# Patient Record
Sex: MALE | Race: BLACK OR AFRICAN AMERICAN | NOT HISPANIC OR LATINO | URBAN - METROPOLITAN AREA
[De-identification: names, ages, dates, MRNs, and addresses within clinical notes are randomized per-mention and may not be internally consistent; named-entity substitution may affect disease eponyms.]

---

## 2021-11-02 ENCOUNTER — INPATIENT (INPATIENT)
Facility: HOSPITAL | Age: 26
LOS: 14 days | Discharge: HOME | End: 2021-11-17
Attending: PSYCHIATRY & NEUROLOGY | Admitting: PSYCHIATRY & NEUROLOGY
Payer: COMMERCIAL

## 2021-11-02 VITALS — HEIGHT: 73 IN

## 2021-11-02 LAB
ALBUMIN SERPL ELPH-MCNC: 4.7 G/DL — SIGNIFICANT CHANGE UP (ref 3.3–5)
ALP SERPL-CCNC: 52 U/L — SIGNIFICANT CHANGE UP (ref 40–120)
ALT FLD-CCNC: 20 U/L — SIGNIFICANT CHANGE UP (ref 4–41)
ANION GAP SERPL CALC-SCNC: 14 MMOL/L — SIGNIFICANT CHANGE UP (ref 7–14)
APAP SERPL-MCNC: <5 UG/ML — LOW (ref 15–25)
AST SERPL-CCNC: 25 U/L — SIGNIFICANT CHANGE UP (ref 4–40)
BASOPHILS # BLD AUTO: 0.06 K/UL — SIGNIFICANT CHANGE UP (ref 0–0.2)
BASOPHILS NFR BLD AUTO: 0.8 % — SIGNIFICANT CHANGE UP (ref 0–2)
BILIRUB SERPL-MCNC: 0.2 MG/DL — SIGNIFICANT CHANGE UP (ref 0.2–1.2)
BUN SERPL-MCNC: 7 MG/DL — SIGNIFICANT CHANGE UP (ref 7–23)
CALCIUM SERPL-MCNC: 9.8 MG/DL — SIGNIFICANT CHANGE UP (ref 8.4–10.5)
CHLORIDE SERPL-SCNC: 103 MMOL/L — SIGNIFICANT CHANGE UP (ref 98–107)
CO2 SERPL-SCNC: 24 MMOL/L — SIGNIFICANT CHANGE UP (ref 22–31)
CREAT SERPL-MCNC: 0.83 MG/DL — SIGNIFICANT CHANGE UP (ref 0.5–1.3)
EOSINOPHIL # BLD AUTO: 0.12 K/UL — SIGNIFICANT CHANGE UP (ref 0–0.5)
EOSINOPHIL NFR BLD AUTO: 1.6 % — SIGNIFICANT CHANGE UP (ref 0–6)
ETHANOL SERPL-MCNC: <10 MG/DL — SIGNIFICANT CHANGE UP
GLUCOSE SERPL-MCNC: 100 MG/DL — HIGH (ref 70–99)
HCT VFR BLD CALC: 41.2 % — SIGNIFICANT CHANGE UP (ref 39–50)
HGB BLD-MCNC: 14.3 G/DL — SIGNIFICANT CHANGE UP (ref 13–17)
IANC: 5.07 K/UL — SIGNIFICANT CHANGE UP (ref 1.5–8.5)
IMM GRANULOCYTES NFR BLD AUTO: 0.1 % — SIGNIFICANT CHANGE UP (ref 0–1.5)
LYMPHOCYTES # BLD AUTO: 1.57 K/UL — SIGNIFICANT CHANGE UP (ref 1–3.3)
LYMPHOCYTES # BLD AUTO: 21.2 % — SIGNIFICANT CHANGE UP (ref 13–44)
MCHC RBC-ENTMCNC: 28.9 PG — SIGNIFICANT CHANGE UP (ref 27–34)
MCHC RBC-ENTMCNC: 34.7 GM/DL — SIGNIFICANT CHANGE UP (ref 32–36)
MCV RBC AUTO: 83.2 FL — SIGNIFICANT CHANGE UP (ref 80–100)
MONOCYTES # BLD AUTO: 0.57 K/UL — SIGNIFICANT CHANGE UP (ref 0–0.9)
MONOCYTES NFR BLD AUTO: 7.7 % — SIGNIFICANT CHANGE UP (ref 2–14)
NEUTROPHILS # BLD AUTO: 5.07 K/UL — SIGNIFICANT CHANGE UP (ref 1.8–7.4)
NEUTROPHILS NFR BLD AUTO: 68.6 % — SIGNIFICANT CHANGE UP (ref 43–77)
NRBC # BLD: 0 /100 WBCS — SIGNIFICANT CHANGE UP
NRBC # FLD: 0 K/UL — SIGNIFICANT CHANGE UP
PLATELET # BLD AUTO: 368 K/UL — SIGNIFICANT CHANGE UP (ref 150–400)
POTASSIUM SERPL-MCNC: 4 MMOL/L — SIGNIFICANT CHANGE UP (ref 3.5–5.3)
POTASSIUM SERPL-SCNC: 4 MMOL/L — SIGNIFICANT CHANGE UP (ref 3.5–5.3)
PROT SERPL-MCNC: 7.4 G/DL — SIGNIFICANT CHANGE UP (ref 6–8.3)
RBC # BLD: 4.95 M/UL — SIGNIFICANT CHANGE UP (ref 4.2–5.8)
RBC # FLD: 13.9 % — SIGNIFICANT CHANGE UP (ref 10.3–14.5)
SALICYLATES SERPL-MCNC: <0.3 MG/DL — LOW (ref 15–30)
SARS-COV-2 RNA SPEC QL NAA+PROBE: SIGNIFICANT CHANGE UP
SODIUM SERPL-SCNC: 141 MMOL/L — SIGNIFICANT CHANGE UP (ref 135–145)
TOXICOLOGY SCREEN, DRUGS OF ABUSE, SERUM RESULT: SIGNIFICANT CHANGE UP
TSH SERPL-MCNC: 1.66 UIU/ML — SIGNIFICANT CHANGE UP (ref 0.27–4.2)
WBC # BLD: 7.4 K/UL — SIGNIFICANT CHANGE UP (ref 3.8–10.5)
WBC # FLD AUTO: 7.4 K/UL — SIGNIFICANT CHANGE UP (ref 3.8–10.5)

## 2021-11-02 PROCEDURE — 99285 EMERGENCY DEPT VISIT HI MDM: CPT

## 2021-11-02 RX ORDER — RISPERIDONE 4 MG/1
2 TABLET ORAL ONCE
Refills: 0 | Status: COMPLETED | OUTPATIENT
Start: 2021-11-02 | End: 2021-11-02
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
## 2021-11-02 NOTE — ED BEHAVIORAL HEALTH ASSESSMENT NOTE - OTHER PAST PSYCHIATRIC HISTORY (INCLUDE DETAILS REGARDING ONSET, COURSE OF ILLNESS, INPATIENT/OUTPATIENT TREATMENT)
Schizo Schizoaffective d/o; several psych admissions, Pt is poor historian, states that the last hospitalization was several years ago, he report that he was at [facility] in  2015. Last treatment was in [facility]

## 2021-11-02 NOTE — ED BEHAVIORAL HEALTH ASSESSMENT NOTE - RISK ASSESSMENT
High risk:  Risk factors: Recent auditory hallucinations of voices telling him to burn his sister's dolls.  Protective factors: Some insight related to his illness, aware these voices are not real. Low Acute Suicide Risk High risk:  Risk factors: psychosis  Protective factors: denies any S/H I/I/P No HX of violence, no CAH
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
## 2021-11-02 NOTE — ED ADULT NURSE NOTE - NSIMPLEMENTINTERV_GEN_ALL_ED
Implemented All Universal Safety Interventions:  [place] to call system. Call bell, personal items and telephone within reach. Instruct patient to call for assistance. Room bathroom lighting operational. Non-slip footwear when patient is off stretcher. Physically safe environment: no spills, clutter or unnecessary equipment. Stretcher in lowest position, wheels locked, appropriate side rails in place.
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
## 2021-11-02 NOTE — ED BEHAVIORAL HEALTH ASSESSMENT NOTE - PATIENT'S CHIEF COMPLAINT
"I called 911 because my father did not give me mponey" "I called 911 because my father did not give me money"

## 2021-11-02 NOTE — ED BEHAVIORAL HEALTH ASSESSMENT NOTE - DESCRIPTION
None Single, unemployed, resides with mother and siblings. Vital Signs Last 24 Hrs  T(C): 37.2 (02 Nov 2021 23:02), Max: 37.2 (02 Nov 2021 23:02)  T(F): 98.9 (02 Nov 2021 23:02), Max: 98.9 (02 Nov 2021 23:02)  HR: 55 (02 Nov 2021 23:02) (55 - 55)  BP: 139/86 (02 Nov 2021 23:02) (139/86 - 139/86)  BP(mean): --  RR: 16 (02 Nov 2021 23:02) (16 - 16)  SpO2: 100% (02 Nov 2021 23:02) (100% - 100%) Single, unemployed, resides with mother.

## 2021-11-02 NOTE — ED BEHAVIORAL HEALTH ASSESSMENT NOTE - MEDICAL ISSUES AND PLAN (INCLUDE STANDING AND PRN MEDICATION)
Current Cr 1.5. Patient is being transferred to medical ER for fluid interventions. Will be transferred to [address] when medically cleared. No acute med issues

## 2021-11-02 NOTE — ED PROVIDER NOTE - PROGRESS NOTE DETAILS
SUSAN: I was signed out this pt pending admission and transfer to [facility]. Pt labs unactionable. Covid Neg. pt became agitated while waiting admission. Required sedation medications to be given. Will closely monitor at this time until admission and bed availability at [facility]. Otherwise medically cleared. KARMEN Ma- pt required restraint at 1210, due to tried to elope multiple times from , was unable to de escalate., medication and restraint ordered.
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
## 2021-11-02 NOTE — ED PROVIDER NOTE - OBJECTIVE STATEMENT
This is a 26 yr old M, pmh schizophrenia with c/o acting out behaviour, verbal aggression, delusional paranoia.   As per family ah, laugh at himself, danger to self, not taking meds since July of 2021 This is a 26 yr old M, pmh schizophrenia with c/o acting out behaviour, verbal aggression, delusional paranoia.   As per family ah, laugh at himself, danger to self, not taking meds since July of 2021  father ( [phone]), mother ( [phone])   Pt paranoid and delusional, believes something wrong with the world and government is out to get him. He states today he has his birthday and asked his father for money, so he can go out to eat but father did not want give him money. They had a verbal disagreement and he called 911.

## 2021-11-03 DIAGNOSIS — F32.1 MAJOR DEPRESSIVE DISORDER, SINGLE EPISODE, MODERATE: ICD-10-CM

## 2021-11-03 LAB
COVID-19 SPIKE DOMAIN AB INTERP: POSITIVE
COVID-19 SPIKE DOMAIN ANTIBODY RESULT: >250 U/ML — HIGH
SARS-COV-2 IGG+IGM SERPL QL IA: >250 U/ML — HIGH
SARS-COV-2 IGG+IGM SERPL QL IA: POSITIVE

## 2021-11-03 RX ORDER — HALOPERIDOL DECANOATE 100 MG/ML
5 INJECTION INTRAMUSCULAR ONCE
Refills: 0 | Status: DISCONTINUED | OUTPATIENT
Start: 2021-11-03 | End: 2021-11-17

## 2021-11-03 RX ORDER — INFLUENZA VIRUS VACCINE 15; 15; 15; 15 UG/.5ML; UG/.5ML; UG/.5ML; UG/.5ML
0.5 SUSPENSION INTRAMUSCULAR ONCE
Refills: 0 | Status: COMPLETED | OUTPATIENT
Start: 2021-11-03 | End: 2021-11-03

## 2021-11-03 RX ORDER — HALOPERIDOL DECANOATE 100 MG/ML
5 INJECTION INTRAMUSCULAR ONCE
Refills: 0 | Status: COMPLETED | OUTPATIENT
Start: 2021-11-03 | End: 2021-11-03

## 2021-11-03 RX ORDER — DIPHENHYDRAMINE HCL 50 MG
50 CAPSULE ORAL ONCE
Refills: 0 | Status: COMPLETED | OUTPATIENT
Start: 2021-11-03 | End: 2021-11-03

## 2021-11-03 RX ORDER — RISPERIDONE 4 MG/1
2 TABLET ORAL AT BEDTIME
Refills: 0 | Status: DISCONTINUED | OUTPATIENT
Start: 2021-11-03 | End: 2021-11-04

## 2021-11-03 RX ORDER — HALOPERIDOL DECANOATE 100 MG/ML
5 INJECTION INTRAMUSCULAR EVERY 6 HOURS
Refills: 0 | Status: DISCONTINUED | OUTPATIENT
Start: 2021-11-03 | End: 2021-11-17

## 2021-11-03 RX ADMIN — HALOPERIDOL DECANOATE 5 MILLIGRAM(S): 100 INJECTION INTRAMUSCULAR at 21:13

## 2021-11-03 RX ADMIN — Medication 50 MILLIGRAM(S): at 12:29

## 2021-11-03 RX ADMIN — RISPERIDONE 2 MILLIGRAM(S): 4 TABLET ORAL at 21:13

## 2021-11-03 RX ADMIN — Medication 2 MILLIGRAM(S): at 12:29

## 2021-11-03 RX ADMIN — Medication 2 MILLIGRAM(S): at 21:13

## 2021-11-03 RX ADMIN — HALOPERIDOL DECANOATE 5 MILLIGRAM(S): 100 INJECTION INTRAMUSCULAR at 12:30

## 2021-11-03 RX ADMIN — HALOPERIDOL DECANOATE 5 MILLIGRAM(S): 100 INJECTION INTRAMUSCULAR at 01:47

## 2021-11-03 RX ADMIN — Medication 2 MILLIGRAM(S): at 01:47

## 2021-11-03 NOTE — ED ADULT NURSE REASSESSMENT NOTE - NS ED NURSE REASSESS COMMENT FT1
pt went to [facility] low 4 with ems in 4 point restraints. Pt attemted to elope 2x from . Pt Was medicate with IM placed into 4 point at 1200 and went to low 4 in ems 4 point.

## 2021-11-03 NOTE — BH CONSULTATION LIAISON PROGRESS NOTE - NSBHMSEAFFCONG_PSY_A_CORE
Interval History:   Dialysate baths changed overnight to 3K/30 bicarb. Otherwise no events. UOP 30cc yesterday; net negative 300cc/24h. Minimal vent settings. Hourly intake 50cc/hr with versed, fentanyl, levophed, and tube feeds.     Review of patient's allergies indicates:   Allergen Reactions    No known drug allergies      Current Facility-Administered Medications   Medication Frequency    0.9%  NaCl infusion (CRRT USE ONLY) Continuous    acetaminophen tablet 325 mg Q4H PRN    azithromycin (ZITHROMAX) 500 mg in sodium chloride 0.9% 250 mL IVPB Q24H    chlorhexidine 0.12 % solution 15 mL BID    dexmedetomidine (PRECEDEX) 400mcg/100mL 0.9% NaCL infusion Continuous    dextrose 50% injection 12.5 g PRN    dextrose 50% injection 25 g PRN    escitalopram oxalate tablet 20 mg Daily    famotidine (PF) injection 20 mg Daily    fentaNYL 2500 mcg in 0.9% sodium chloride 250 mL infusion premix (titrating) Continuous    ganciclovir (CYTOVENE) 200 mg in sodium chloride 0.9% 100 mL IVPB Q24H    glucagon (human recombinant) injection 1 mg PRN    glucose chewable tablet 16 g PRN    glucose chewable tablet 24 g PRN    heparin (porcine) injection 5,000 Units Q8H    hydrocortisone sodium succinate injection 100 mg Q8H    insulin regular (Humulin R) 100 Units in sodium chloride 0.9% 100 mL infusion Continuous    k phos di & mono-sod phos mono 250 mg tablet 2 tablet Q4H PRN    levothyroxine tablet 125 mcg Before breakfast    midazolam (VERSED) 1 mg/mL in sodium chloride 0.9% 100 mL infusion (titrating) Continuous    norepinephrine 16 mg in dextrose 5 % 250 mL infusion Continuous    piperacillin-tazobactam 4.5 g in sodium chloride 0.9% 100 mL IVPB (ready to mix system) Q8H    posaconazole 300 mg in dextrose 5 % 150 mL infusion Daily    ribavirin capsule 600 mg BID    sodium chloride 0.9% flush 3 mL Q8H       Objective:     Vital Signs (Most Recent):  Temp: 97.2 °F (36.2 °C) (03/16/18 1100)  Pulse: (!) 119  (03/16/18 1321)  Resp: (!) 0 (03/16/18 0900)  BP: (!) 142/73 (03/15/18 0731)  SpO2: 100 % (03/16/18 1321)  O2 Device (Oxygen Therapy): ventilator (03/16/18 1321) Vital Signs (24h Range):  Temp:  [96.9 °F (36.1 °C)-98.2 °F (36.8 °C)] 97.2 °F (36.2 °C)  Pulse:  [105-135] 119  Resp:  [0-22] 0  SpO2:  [96 %-100 %] 100 %  Arterial Line BP: (100-150)/(41-77) 108/41     Weight: 81 kg (178 lb 9.2 oz) (03/16/18 0626)  Body mass index is 27.97 kg/m².  Body surface area is 1.96 meters squared.    I/O last 3 completed shifts:  In: 7933.5 [I.V.:5833.5; NG/GT:450; IV Piggyback:1650]  Out: 80879 [Urine:190; Other:45785]    Physical Exam   Constitutional: He appears well-developed and well-nourished. He is sedated and intubated.   HENT:   Head: Normocephalic and atraumatic.   Neck: Neck supple. No thyromegaly present.   Cardiovascular: Normal rate and regular rhythm.    Pulmonary/Chest: He is intubated. He has no wheezes. He has no rales.   Abdominal: Soft. He exhibits no distension.   Musculoskeletal: He exhibits no deformity. Edema: no pitting edema.   Skin: Skin is warm and dry. He is not diaphoretic.       Significant Labs:  CBC:   Recent Labs  Lab 03/16/18  0328   WBC 5.16   RBC 2.66*   HGB 7.8*   HCT 24.7*      MCV 93   MCH 29.3   MCHC 31.6*     CMP:   Recent Labs  Lab 03/16/18  0328  03/16/18  1148   *  146*  146*  < > 160*   CALCIUM 9.0  9.0  9.0  < > 9.5   ALBUMIN 2.4*  2.4*  --   --    PROT 7.0  --   --      142  142  < > 143   K 5.0  5.0  5.0  5.0  < > 4.5  4.5   CO2 20*  20*  20*  < > 21*     106  106  < > 105   BUN 4*  4*  4*  < > 5*   CREATININE 0.8  0.8  0.8  < > 0.7   ALKPHOS 195*  --   --    ALT 13  --   --    AST 45*  --   --    BILITOT 1.7*  --   --    < > = values in this interval not displayed.  All labs within the past 24 hours have been reviewed.     Significant Imaging:  Labs: Reviewed  X-Ray: Reviewed; improved   Non-congruent

## 2021-11-03 NOTE — BH PATIENT PROFILE - NSSBIRTOFTENALCOHOL_GEN_A_CORE
Spoke with patient son and reminded of the 515 am arrival time. He expressed understanding for all that was discussed.   
Never

## 2021-11-03 NOTE — BH CONSULTATION LIAISON PROGRESS NOTE - CASE SUMMARY
25 yo single, male unemployed, domiciled with parents, non-caregiver, with a history of schizoaffective disorder, with multiple inpatient admissions, non-compliant with his treatment and meds, history of substance abuse, namely alcohol, K2,  and cannabis; reports of daily MJ use 2x/day. BIB ems on 11/2/21 after arguing with father about giving him money for his birthday 11/2.     Pt presents disorganized in thought process and behavior, in the context of non compliance with medications and on going substance use. He has limited insight in to his illness and the need for treatment. He requires inpatient psychiatric admission for safety and stabilization.    Pt to be admitted on 9.39. Recommend to restart Risperdal.

## 2021-11-03 NOTE — ED ADULT NURSE REASSESSMENT NOTE - NS ED NURSE REASSESS COMMENT FT1
awake very restless pacing all over constantly knocking on glass   medicated as ordered  will monitor

## 2021-11-03 NOTE — BH CONSULTATION LIAISON PROGRESS NOTE - NSBHCHARTREVIEWVS_PSY_A_CORE FT
Vital Signs Last 24 Hrs  T(C): 37.1 (03 Nov 2021 07:10), Max: 37.2 (02 Nov 2021 23:02)  T(F): 98.7 (03 Nov 2021 07:10), Max: 98.9 (02 Nov 2021 23:02)  HR: 71 (03 Nov 2021 07:10) (55 - 71)  BP: 121/79 (03 Nov 2021 07:10) (121/79 - 139/86)  BP(mean): --  RR: 16 (03 Nov 2021 07:10) (16 - 16)  SpO2: 100% (03 Nov 2021 07:10) (100% - 100%)

## 2021-11-03 NOTE — ED BEHAVIORAL HEALTH NOTE - BEHAVIORAL HEALTH NOTE
Patient attempted to elope at 11:50a when another patient was being transferred out of the  ED.   He was able to be redirected and then went to his room.   Did not receive PRNs nor was he placed in restraints.

## 2021-11-03 NOTE — ED ADULT NURSE REASSESSMENT NOTE - NS ED NURSE REASSESS COMMENT FT1
Received pt from RN break coverage. Pt is currently awake s/p meds received. Pt continues to report AH (not command) with bizarre affect. Pt requesting to leave hospital. Pt does not display aggressive behaviors however continues to have poor insight. Awaiting available bed for psychiatric admission. Will continue to monitor for safety.

## 2021-11-03 NOTE — ED BEHAVIORAL HEALTH NOTE - BEHAVIORAL HEALTH NOTE
*Has the patient had a COVID-19 test in the last 90 days?  (  ) Yes   ( X ) No   (  ) Unknown- Reason: _____     IF YES PROCEED TO QUESTION #2. IF NO OR UNKNOWN, PLEASE SKIP TO QUESTION #3.     Date of test(s) and result(s): ________     *Has the patient tested positive for COVID-19 antibodies? (  ) Yes   (  X) No   (  ) Unknown- Reason: _____     IF YES PROCEED TO QUESTION #4. IF NO or UNKNOWN, PLEASE SKIP TO QUESTION #5.     Date of positive antibody test: ________     *Has the patient received 2 doses of the COVID-19 vaccine? ( X ) Yes   (  ) No   (  ) Unknown- Reason: _____     IF YES PROCEED TO QUESTION #6. IF NO or UNKNOWN, PLEASE SKIP TO QUESTION #7.      Date of second dose: __4/21______     *In the past 10 days, has the patient been around anyone with a positive COVID-19 test?* (  ) Yes   ( X ) No   (  ) Unknown- Reason: __     IF YES PROCEED TO QUESTION #8. IF NO or UNKNOWN, PLEASE SKIP TO QUESTION #13.     Was the patient within 6 feet of them for at least 15 minutes? (  ) Yes   (  ) No   (  ) Unknown- Reason: _____     Did the patient provide care for them? (  ) Yes   (  ) No   (  ) Unknown- Reason: ______     Did the patient have direct physical contact with them (touched, hugged, or kissed them)? (  ) Yes   (  ) No    (  ) Unknown- Reason: __     Did the patient share eating or drinking utensils with them? (  ) Yes   (  ) No    (  ) Unknown- Reason: ____     Did they sneeze, cough, or somehow get respiratory droplets on the patient? (  ) Yes   (  ) No    (  ) Unknown- Reason: ______     *Has the patient been out of [place] within the past 10 days?* (  ) Yes   (  X) No   (  ) Unknown- Reason: _____     IF YES PLEASE ANSWER THE FOLLOWING QUESTIONS:     Which state/country did they go to? ______     Were they there over 24 hours? (  ) Yes   (  ) No    (  ) Unknown- Reason: ______     Date of return to [facility]: ______

## 2021-11-03 NOTE — BH PATIENT PROFILE - HOME MEDICATIONS
risperiDONE 50 mg/2 weeks intramuscular injection, extended release , 50 milligram(s) intramuscular once  haloperidol 10 mg oral tablet , 1 tab(s) orally once a day (at bedtime)  haloperidol 5 mg oral tablet , 1 tab(s) orally every 6 hours, As needed, Psychosis/Agitation  benztropine 1 mg oral tablet , 1 tab(s) orally once a day (at bedtime)

## 2021-11-03 NOTE — BH CONSULTATION LIAISON PROGRESS NOTE - NSBHFUPINTERVALHXFT_PSY_A_CORE
27 yo single, male unemployed, domiciled with parents, non-caregiver, with a history of schizoaffective disorder, with multiple inpatient admissions, non-compliant with his treatment and meds, history of substance abuse, namely alcohol, K2,  and cannabis; reports of daily MJ use 2x/day. BIB ems on 11/2/21 after arguing with father about giving him money for his birthday 11/2. On assessment in am, pt is oddly related, at times mumbling to him self, cooperative, mildly anxious about being in the ED, did not sleep well as he was "thinking about going home". Did receive prn ativan/haldol IM for restlessness/pacing. Pt denying having a mental illness nor suppose to be taking medications. He denies current paranoia, AVH, states "my dad is paranoid". Pt is requesting to be d/c. Pt made aware that there were concerns that he was not doing well and the plan was inpatient hospitalization.   25 yo single, male unemployed, domiciled with parents, non-caregiver, with a history of schizoaffective disorder, with multiple inpatient admissions, non-compliant with his treatment and meds, history of substance abuse, namely alcohol, K2,  and cannabis; reports of daily MJ use 2x/day. BIB ems on 11/2/21 after arguing with father about giving him money for his birthday 11/2. On assessment in am, pt is oddly related, at times mumbling to himself, cooperative, mildly anxious about being in the ED, did not sleep well as he was "thinking about going home". Did receive prn ativan/haldol IM for restlessness/pacing. Pt denying having a mental illness nor supposed to be taking medications. He denies current paranoia, AV, states "my dad is paranoid". Pt is requesting to be d/c. Pt made aware that there were concerns that he was not doing well and the plan was inpatient hospitalization.

## 2021-11-03 NOTE — BH CONSULTATION LIAISON PROGRESS NOTE - NSBHASSESSMENTFT_PSY_ALL_CORE
Pt presents disorganized in thought process and behavior, in the context of non compliance with medications and on going substance use. He has limited insight in to his illness and the need for treatment. He requires inpatient psychiatric admission for safety and stabilization.

## 2021-11-03 NOTE — ED ADULT NURSE REASSESSMENT NOTE - NS ED NURSE REASSESS COMMENT FT1
Pt is currently awake, less restless, states "I'm sad". Denies current s/i h/i i/p. VSS. Awaiting available bed for psychiatric admission.  Will continue to monitor for safety.

## 2021-11-04 DIAGNOSIS — F12.10 CANNABIS ABUSE, UNCOMPLICATED: ICD-10-CM

## 2021-11-04 LAB
CHOLEST SERPL-MCNC: 188 MG/DL — SIGNIFICANT CHANGE UP
COVID-19 NUCLEOCAPSID GAM AB INTERP: NEGATIVE — SIGNIFICANT CHANGE UP
COVID-19 NUCLEOCAPSID TOTAL GAM ANTIBODY RESULT: 0.08 INDEX — SIGNIFICANT CHANGE UP
HDLC SERPL-MCNC: 73 MG/DL — SIGNIFICANT CHANGE UP
LIPID PNL WITH DIRECT LDL SERPL: 104 MG/DL — HIGH
NON HDL CHOLESTEROL: 115 MG/DL — SIGNIFICANT CHANGE UP
SARS-COV-2 IGG+IGM SERPL QL IA: 0.08 INDEX — SIGNIFICANT CHANGE UP
SARS-COV-2 IGG+IGM SERPL QL IA: NEGATIVE — SIGNIFICANT CHANGE UP
TRIGL SERPL-MCNC: 55 MG/DL — SIGNIFICANT CHANGE UP

## 2021-11-04 PROCEDURE — 99222 1ST HOSP IP/OBS MODERATE 55: CPT

## 2021-11-04 RX ORDER — ARIPIPRAZOLE 15 MG/1
10 TABLET ORAL DAILY
Refills: 0 | Status: DISCONTINUED | OUTPATIENT
Start: 2021-11-04 | End: 2021-11-05

## 2021-11-04 RX ORDER — DIVALPROEX SODIUM 500 MG/1
500 TABLET, DELAYED RELEASE ORAL
Refills: 0 | Status: DISCONTINUED | OUTPATIENT
Start: 2021-11-04 | End: 2021-11-17

## 2021-11-04 RX ORDER — LANOLIN ALCOHOL/MO/W.PET/CERES
5 CREAM (GRAM) TOPICAL AT BEDTIME
Refills: 0 | Status: DISCONTINUED | OUTPATIENT
Start: 2021-11-04 | End: 2021-11-17

## 2021-11-04 RX ADMIN — Medication 2 MILLIGRAM(S): at 19:39

## 2021-11-04 RX ADMIN — HALOPERIDOL DECANOATE 5 MILLIGRAM(S): 100 INJECTION INTRAMUSCULAR at 19:39

## 2021-11-04 RX ADMIN — DIVALPROEX SODIUM 500 MILLIGRAM(S): 500 TABLET, DELAYED RELEASE ORAL at 19:38

## 2021-11-04 RX ADMIN — Medication 5 MILLIGRAM(S): at 19:38

## 2021-11-04 NOTE — BH INPATIENT PSYCHIATRY ASSESSMENT NOTE - RISK ASSESSMENT
Risk factors include male gender, non-adherence, poor insight, multiple hospitalizations, substance use, psychotic disorder.    Protective factors: denies any S/H I/I/P No HX of violence, no CAH, residential stability, supportive family, physically healthy. Pt. denies access to lethal means.   The pt. is a moderate risk of harm to others given aggression once decompensated. He requires inpatient hospitalization to mitigate this risk.

## 2021-11-04 NOTE — BH INPATIENT PSYCHIATRY ASSESSMENT NOTE - NSBHCHARTREVIEWVS_PSY_A_CORE FT
Vital Signs Last 24 Hrs  T(C): 36.3 (11-04-21 @ 06:44), Max: 36.8 (11-03-21 @ 22:16)  T(F): 97.4 (11-04-21 @ 06:44), Max: 98.2 (11-03-21 @ 22:16)  HR: 92 (11-04-21 @ 07:54) (92 - 92)  BP: --  BP(mean): --  RR: --  SpO2: --    Orthostatic VS  11-04-21 @ 08:29  Lying BP: --/-- HR: --  Sitting BP: 112/60 HR: --  Standing BP: 107/53 HR: --  Site: --  Mode: electronic  Orthostatic VS  11-03-21 @ 19:36  Lying BP: --/-- HR: --  Sitting BP: 117/65 HR: 83  Standing BP: 120/73 HR: 95  Site: --  Mode: --   Vital Signs Last 24 Hrs  T(C): 36.4 (11-04-21 @ 14:22), Max: 36.8 (11-03-21 @ 22:16)  T(F): 97.6 (11-04-21 @ 14:22), Max: 98.2 (11-03-21 @ 22:16)  HR: 92 (11-04-21 @ 07:54) (92 - 92)  BP: --  BP(mean): --  RR: --  SpO2: --    Orthostatic VS  11-04-21 @ 08:29  Lying BP: --/-- HR: --  Sitting BP: 112/60 HR: --  Standing BP: 107/53 HR: --  Site: --  Mode: electronic  Orthostatic VS  11-03-21 @ 19:36  Lying BP: --/-- HR: --  Sitting BP: 117/65 HR: 83  Standing BP: 120/73 HR: 95  Site: --  Mode: --

## 2021-11-04 NOTE — BH INPATIENT PSYCHIATRY ASSESSMENT NOTE - DETAILS
none threatened to hurt his sister's doll?  in the past, "argued with his father as per patient's mother) today

## 2021-11-04 NOTE — BH INPATIENT PSYCHIATRY ASSESSMENT NOTE - NSCOMMENTSUICRISKFACT_PSY_ALL_CORE
Risk factors include male gender, non-adherence, poor insight, multiple hospitalizations, substance use, psychotic disorder.

## 2021-11-04 NOTE — BH INPATIENT PSYCHIATRY ASSESSMENT NOTE - CURRENT MEDICATION
MEDICATIONS  (STANDING):  diVALproex  milliGRAM(s) Oral two times a day  influenza   Vaccine 0.5 milliLiter(s) IntraMuscular once  melatonin. 5 milliGRAM(s) Oral at bedtime  risperiDONE   Tablet 2 milliGRAM(s) Oral at bedtime    MEDICATIONS  (PRN):  haloperidol     Tablet 5 milliGRAM(s) Oral every 6 hours PRN agitation  haloperidol    Injectable 5 milliGRAM(s) IntraMuscular Once PRN severe agitation  LORazepam     Tablet 2 milliGRAM(s) Oral every 6 hours PRN Agitation  LORazepam   Injectable 2 milliGRAM(s) IntraMuscular Once PRN severe agitation   MEDICATIONS  (STANDING):  ARIPiprazole 10 milliGRAM(s) Oral daily  diVALproex  milliGRAM(s) Oral two times a day  influenza   Vaccine 0.5 milliLiter(s) IntraMuscular once  melatonin. 5 milliGRAM(s) Oral at bedtime    MEDICATIONS  (PRN):  haloperidol     Tablet 5 milliGRAM(s) Oral every 6 hours PRN agitation  haloperidol    Injectable 5 milliGRAM(s) IntraMuscular Once PRN severe agitation  LORazepam     Tablet 2 milliGRAM(s) Oral every 6 hours PRN Agitation  LORazepam   Injectable 2 milliGRAM(s) IntraMuscular Once PRN severe agitation

## 2021-11-04 NOTE — BH INPATIENT PSYCHIATRY ASSESSMENT NOTE - HPI (INCLUDE ILLNESS QUALITY, SEVERITY, DURATION, TIMING, CONTEXT, MODIFYING FACTORS, ASSOCIATED SIGNS AND SYMPTOMS)
The patient is 27 yo single, unemployed, domiciled with parents, non-caregiver, with a history of schizoaffective disorder, with multiple inpatient admissions, most recently "couple of years ago" , non-compliant with his treatment and meds, No history of suicidal attempts or physical aggression noted, no legal involvement, no history pf arrests, history of substance abuse, namely alcohol, K2,  and cannabis; denies recent use. Brought into the ER by EMS this evening after having an argument with his father "because he did not give me money for my birthday". Patient called 911 himself.   On assessment , patient is calm and cooperative. He reports that he was upset because it was his birthday and his father did not give him money. They had an argument "because what kind of father does that? They don't say thank you when I do things for them" He states that he is OK otherwise. Patient denies current depressive symptoms including depressed mood, anhedonia, changes in energy/concentration/appetite, sleep disturbances, or feelings of guilt. Patient denies current psychotic symptoms at first, but becomes thought disorganized; with thought blocking, laughs inappropriately sometimes. He is bizarre, he admitted to paranoid delusions on arrival, he stated that he is being watched by the government; then he starts talking about "strange feelings," says "you know this trees are hiding something, I don't know, there is something wrong with them" He denies hearing voices, no visions or delusions, no ideas of reference, thought insertion/broadcasting. Patient denies current manic symptoms including elevated mood, increased irritability, mood lability, distractibility, grandiosity, pressured speech, and increase in goal-directed activity at night, or decreased need for sleep. Pt denies current suicidal ideations, intent or plan. Denies any homicidal ideations.   Patient states that he does not take meds because he does not need to be on med, because he is using marijuana on daily bases and does yoga. He states that he is learning a new spiritual / ancient language, in order to become spiritual. When he is asked to say something in this language he starts mumbling something incoherent. He does not want to start taking any meds "again or see a psychiatrist; "that chapter of my life is closed, I don't need to take any medications" Patient starts laughing , when he asked why he is laughing, he says that he "felt something funny"  No agitation.     Collateral:  Collateral information obtained by JONO from patient's mother as follows as documented on [place]: As per the mother patient has been laughing , talking to himself and stopped taking care of himself. " See JONO-  note.    On the unit the patient is in behavioral control, is calm and cooperative since being admitted. He is on elopement precautions due to attempting to elope from the ED multiple times yesterday. He was also in 4 point restraints for transfer from the ED. The pt. was seen with the writer, JONO and medical student in his room. He was agreeable to speaking. The pt. was asked to describe events leading up the hospitalization and he stated "You know what, I was meditating and doing yoga, and now I feel better." Upon further exploration, the pt. stated that yesterday was his birthday and he asked his father for birthday money. He reports he father yelled at him and they engaged in a verbal altercation. The pt. stated he called the police believing they could help to de-escalate the situation. He denied pulling a knife on his sister. During the interview, the patient was noted to be pre-occupied with spiritual and holistic methods, attempting to present himself as a spiritual being.   The patient reports that he has been off of medications for the last 3 months, last seen at [facility] 3 months ago. The pt. reports that he requested to be taken off of Risperdal and was discharged from their clinic. He recently reports that his sleep has been poor and was able to admit that this could be attributed to being off of medications. The pt. also reported Hx of AVH during prior hospitalizations. He now states that "God speaks to me through my heart." He denies that he hears commands, instead states "He helps me get through hard times." The pt. stated he last heard a "burp" yesterday from God while he was hungry. The pt. said he understood the burp to mean "Look to you right" and he saw a carton of soy milk on his window. The pt. denies VH, delusions, SI/HI/i/p. When asked if he has experienced episodes of depression or nehemiah, he was avoidant and circumstantial in his responses. He alluded to being "sad" at 15 y.o., unable to give a time frame or symptoms. He reports taking Adderall from a neighbor at that time and felt "I could never be sad again" and was to focus better while doing yoga. In terms of nehemiah, the pt. reports that when he practices "Chi-Dong" he gets a surge of energy, however, states this energy varies in duration. He denied impulsivity, racing thoughts, increased goal directed behavior, agitation/irritability. Pt. denies Hx of SA, SI and NSSIB.  The pt. reports he smokes weed daily, "a couple blunts". He denies ETOH, K2, cocaine, heroin and other street drug use. The pt. reports marijuana makes him calm after he does yoga and takes a shower. He reports finishing a couple college courses and did graduate from [email or web address] Denies Hx of special ed or seeing a mental health provider as a child. He lives with his parents and has 2 siblings over 30 (boy and girl), who do not live in the home. The pt. does not work at this time, he stated "I don't like working." He reports working at [employer] last year and does not want to go back. He reports his parents support him financially.   The pt. no longer wants to be on Risperdal stating that it made feel "slow" with poor concentration. He agreed to trial Abilify as it seems to have worked for him in the past. He is also agreeable to Depakote.

## 2021-11-04 NOTE — BH INPATIENT PSYCHIATRY ASSESSMENT NOTE - NSBHASSESSSUMMFT_PSY_ALL_CORE
The patient is 25 yo single, unemployed, domiciled with parents, non-caregiver, with a history of schizoaffective disorder, with multiple inpatient admissions, most recently "couple of years ago" , non-compliant with his treatment and meds, No history of suicidal attempts or physical aggression noted, no legal involvement, no history pf arrests, history of substance abuse, namely alcohol, K2,  and cannabis; denies recent use. Brought into the ER by EMS this evening after having an argument with his father "because he did not give me money for my birthday". Patient called 911 himself.    The patient is delusional, experiencing AH in the context of lack of treatment. The patient is in behavioral control on the unit, however, is aggressive once decompensated. He is psychotically ambivalent, is guarded and minimizing symptoms.  The patient is 25 yo single, unemployed, domiciled with parents, non-caregiver, with a history of schizoaffective disorder, with multiple inpatient admissions, most recently "couple of years ago" , non-compliant with his treatment and meds, No history of suicidal attempts or physical aggression noted, no legal involvement, no history pf arrests, history of substance abuse, namely alcohol, K2,  and cannabis; denies recent use. Brought into the ER by EMS this evening after having an argument with his father "because he did not give me money for my birthday". Patient called 911 himself.    The patient is delusional, experiencing AH in the context of lack of treatment. Mood instability demonstrated by aggression at home and per collateral. The patient is in behavioral control on the unit, however, is aggressive once decompensated. He is psychotically ambivalent, is guarded and minimizing symptoms. The patient is agreeable to Abilify and Depakote; is now refusing Risperdal.    Plan:  Psychiatry: D/C Risperdal; initiate Abilify 10mg, Depakote DR 500mg, melatonin 5mg  PRN medications: Haldol  5mg, Ativan 2mg, Benadryl 50mg  Observations: routine observations  Legal: 9.39 Involuntary status  Medical: routine medical observations; no acute concerns  Goal: Sx reduction, medication management, safety planning, milieu therapy.  Dispo: pending

## 2021-11-04 NOTE — BH INPATIENT PSYCHIATRY ASSESSMENT NOTE - NSBHMETABOLIC_PSY_ALL_CORE_FT
BMI:   HbA1c:   Glucose:   BP: 117/69 (11-03-21 @ 13:51) (117/69 - 139/86)  Lipid Panel: Date/Time: 11-04-21 @ 12:08  Cholesterol, Serum: 188  Direct LDL: --  HDL Cholesterol, Serum: 73  Total Cholesterol/HDL Ration Measurement: --  Triglycerides, Serum: 55

## 2021-11-04 NOTE — BH SOCIAL WORK INITIAL PSYCHOSOCIAL EVALUATION - NSCMSPTSTRENGTHS_PSY_ALL_CORE
Emily/spirituality/Intact family/Interpersonal skills/Physically healthy/Positive attitude/Resourceful

## 2021-11-04 NOTE — BH INPATIENT PSYCHIATRY ASSESSMENT NOTE - DESCRIPTION
Single, unemployed, resides with parents, 2 older siblings that do not live in the home. Has taken college classes, graduated [employer]

## 2021-11-04 NOTE — BH SOCIAL WORK INITIAL PSYCHOSOCIAL EVALUATION - OTHER PAST PSYCHIATRIC HISTORY (INCLUDE DETAILS REGARDING ONSET, COURSE OF ILLNESS, INPATIENT/OUTPATIENT TREATMENT)
The patient is 25 yo single, unemployed male, domiciled with parents with a history of schizoaffective disorder, hx multiple inpatient admissions. Non-compliant with his treatment and meds, No history of suicidal attempts or physical aggression, no legal involvement, history of substance abuse (alcohol, K2,  and cannabis) denies recent use. Brought into the ER by EMS this evening after having an argument with his father "because he did not give me money for my birthday". Patient called 911 himself. Patient is 27 yo single, unemployed male, domiciled with parents with a history of schizoaffective disorder, hx multiple inpatient admissions reports last hospitalization 4 years ago. Pt reports he ended treatment 3 months ago and has not been taking medication. No history of suicidal attempts or physical aggression, no legal involvement, reports frequent marijuana use. Brought into the ER by EMS this evening after having an argument with his father "because he did not give me money for my birthday". Patient called 911 himself.

## 2021-11-04 NOTE — BH INPATIENT PSYCHIATRY ASSESSMENT NOTE - OTHER PAST PSYCHIATRIC HISTORY (INCLUDE DETAILS REGARDING ONSET, COURSE OF ILLNESS, INPATIENT/OUTPATIENT TREATMENT)
Schizoaffective d/o; several psych admissions, Pt is poor historian, states that the last hospitalization was several years ago, he report that he was at [facility] in  2015. Last treatment was in [facility]

## 2021-11-05 PROCEDURE — 99232 SBSQ HOSP IP/OBS MODERATE 35: CPT

## 2021-11-05 PROCEDURE — 90853 GROUP PSYCHOTHERAPY: CPT

## 2021-11-05 RX ORDER — ARIPIPRAZOLE 15 MG/1
15 TABLET ORAL DAILY
Refills: 0 | Status: DISCONTINUED | OUTPATIENT
Start: 2021-11-05 | End: 2021-11-09

## 2021-11-05 RX ADMIN — ARIPIPRAZOLE 10 MILLIGRAM(S): 15 TABLET ORAL at 10:11

## 2021-11-05 RX ADMIN — DIVALPROEX SODIUM 500 MILLIGRAM(S): 500 TABLET, DELAYED RELEASE ORAL at 10:11

## 2021-11-05 RX ADMIN — Medication 5 MILLIGRAM(S): at 21:39

## 2021-11-05 RX ADMIN — DIVALPROEX SODIUM 500 MILLIGRAM(S): 500 TABLET, DELAYED RELEASE ORAL at 21:39

## 2021-11-05 NOTE — BH INPATIENT PSYCHIATRY PROGRESS NOTE - CURRENT MEDICATION
MEDICATIONS  (STANDING):  ARIPiprazole 10 milliGRAM(s) Oral daily  diVALproex  milliGRAM(s) Oral two times a day  influenza   Vaccine 0.5 milliLiter(s) IntraMuscular once  melatonin. 5 milliGRAM(s) Oral at bedtime    MEDICATIONS  (PRN):  haloperidol     Tablet 5 milliGRAM(s) Oral every 6 hours PRN agitation  haloperidol    Injectable 5 milliGRAM(s) IntraMuscular Once PRN severe agitation  LORazepam     Tablet 2 milliGRAM(s) Oral every 6 hours PRN Agitation  LORazepam   Injectable 2 milliGRAM(s) IntraMuscular Once PRN severe agitation   MEDICATIONS  (STANDING):  ARIPiprazole 15 milliGRAM(s) Oral daily  diVALproex  milliGRAM(s) Oral two times a day  influenza   Vaccine 0.5 milliLiter(s) IntraMuscular once  melatonin. 5 milliGRAM(s) Oral at bedtime    MEDICATIONS  (PRN):  haloperidol     Tablet 5 milliGRAM(s) Oral every 6 hours PRN agitation  haloperidol    Injectable 5 milliGRAM(s) IntraMuscular Once PRN severe agitation  LORazepam     Tablet 2 milliGRAM(s) Oral every 6 hours PRN Agitation  LORazepam   Injectable 2 milliGRAM(s) IntraMuscular Once PRN severe agitation

## 2021-11-05 NOTE — BH TREATMENT PLAN - NSTXMEDICINTERPR_PSY_ALL_CORE
lt hand pain
Pt would benefit from demonstrating medication compliance by day seven. Psychiatric rehabilitation staff will encourage pt to attend daily symptom management group and engage in individual therapy session to achieve his goal.

## 2021-11-05 NOTE — BH INPATIENT PSYCHIATRY PROGRESS NOTE - NSBHASSESSSUMMFT_PSY_ALL_CORE
The patient is 27 yo single, unemployed, domiciled with parents, non-caregiver, with a history of schizoaffective disorder, with multiple inpatient admissions, most recently "couple of years ago" , non-compliant with his treatment and meds, No history of suicidal attempts or physical aggression noted, no legal involvement, no history pf arrests, history of substance abuse, namely alcohol, K2,  and cannabis; denies recent use. Brought into the ER by EMS this evening after having an argument with his father "because he did not give me money for my birthday". Patient called 911 himself.    The patient is delusional, experiencing AH in the context of lack of treatment on admission. Denies AH today. Mood instability demonstrated by aggression at home and per collateral. The patient is in behavioral control on the unit, however, is aggressive once decompensated. He is psychotically ambivalent, is guarded and minimizing symptoms. Denies SI/HI/i/p. Denies visual hallucinations. The patient is agreeable to Abilify and Depakote; is now refusing Risperdal.     Plan:  Psychiatry: D/C Risperdal; initiate Abilify 10mg, Depakote DR 500mg, melatonin 5mg  PRN medications: Haldol  5mg, Ativan 2mg, Benadryl 50mg  Observations: routine observations  Legal: 9.39 Involuntary status  Medical: routine medical observations; no acute concerns  Goal: Sx reduction, medication management, safety planning, milieu therapy.  Dispo: pending The patient is 25 yo single, unemployed, domiciled with parents, non-caregiver, with a history of schizoaffective disorder, with multiple inpatient admissions, most recently "couple of years ago" , non-compliant with his treatment and meds, No history of suicidal attempts or physical aggression noted, no legal involvement, no history pf arrests, history of substance abuse, namely alcohol, K2,  and cannabis; denies recent use. Brought into the ER by EMS this evening after having an argument with his father "because he did not give me money for my birthday". Patient called 911 himself.    The patient is delusional, bizarre (practicing Hang-Chi in day room) experiencing AH in the context of lack of treatment on admission. Denies AH today. Mood instability demonstrated by aggression at home and per collateral. The patient is in behavioral control on the unit, however, is aggressive once decompensated. He is psychotically ambivalent, is guarded and minimizing symptoms. Denies SI/HI/i/p. Denies visual hallucinations. The patient is agreeable to Abilify and Depakote; is now refusing Risperdal.     Plan:  Psychiatry: D/C Risperdal; Abilify 15mg, Depakote DR 500mg, melatonin 5mg- VPA level Monday  PRN medications: Haldol  5mg, Ativan 2mg, Benadryl 50mg  Observations: routine observations  Legal: 9.39 Involuntary status  Medical: routine medical observations; no acute concerns  Goal: Sx reduction, medication management, safety planning, milieu therapy.  Dispo: pending

## 2021-11-05 NOTE — BH INPATIENT PSYCHIATRY PROGRESS NOTE - NSBHFUPINTERVALHXFT_PSY_A_CORE
Patient is seen for schizoaffective disorder and psychosis. Patient chart and labs were reviewed with the team. Patient was seen by NP and medical student (writer). Patient was seen in his room laying in bed. Mood is "good." Reports feeling calm when he woke up this morning and spent an hour doing yoga in his room. Despite the events that occurred in the unit this morning, patient stated that he feels safe and will not be involved in the aggressive behaviors between other patients on the unit. Endorsed sleeping well last night on melatonin 5mg HS. He reports feeling tired on the medications and was reassured by the NP and writer that he is getting adjusted to the medications. Per staff, there were no reported incidents of the patient trying to leave the unit. Patient's mother visited the unit last night. Patient provided consent for team to speak with his mother to provide update on his status. He stated that when he leaves the hospital, he just wants his father to give him some allowance money. Claims that his father always gives him an allowance, regardless if they have arguments or fights. No interested in applying for disability and is content with living off his parents at the moment. Denies SI/HI/i/p. Denies AVH and delusions. Patient remains bizarre but does not endorse any delusions today.     Patient is compliant with all medications. Plan is to continue Abilify 10mg QD and Depakote 500mg BID.

## 2021-11-06 PROCEDURE — 99232 SBSQ HOSP IP/OBS MODERATE 35: CPT

## 2021-11-06 RX ORDER — TRAZODONE HCL 50 MG
50 TABLET ORAL AT BEDTIME
Refills: 0 | Status: DISCONTINUED | OUTPATIENT
Start: 2021-11-06 | End: 2021-11-17

## 2021-11-06 RX ADMIN — ARIPIPRAZOLE 15 MILLIGRAM(S): 15 TABLET ORAL at 09:42

## 2021-11-06 RX ADMIN — DIVALPROEX SODIUM 500 MILLIGRAM(S): 500 TABLET, DELAYED RELEASE ORAL at 09:42

## 2021-11-06 NOTE — BH INPATIENT PSYCHIATRY PROGRESS NOTE - NSBHASSESSSUMMFT_PSY_ALL_CORE
The patient is 25 yo single, unemployed, domiciled with parents, non-caregiver, with a history of schizoaffective disorder, with multiple inpatient admissions, most recently "couple of years ago" , non-compliant with his treatment and meds, No history of suicidal attempts or physical aggression noted, no legal involvement, no history pf arrests, history of substance abuse, namely alcohol, K2,  and cannabis; denies recent use. Brought into the ER by EMS this evening after having an argument with his father "because he did not give me money for my birthday". Patient called 911 himself.    The patient is delusional, bizarre (practicing Hang-Chi in day room) experiencing AH in the context of lack of treatment on admission. Denies AH today. Mood instability demonstrated by aggression at home and per collateral. The patient is in behavioral control on the unit, however, is aggressive once decompensated. He is psychotically ambivalent, is guarded and minimizing symptoms. Denies SI/HI/i/p. Denies visual hallucinations. The patient is agreeable to Abilify and Depakote; is now refusing Risperdal.     Plan:  Psychiatry: D/C Risperdal; Abilify 15mg, Depakote DR 500mg, melatonin 5mg- VPA level Monday  PRN medications: Haldol  5mg, Ativan 2mg, Benadryl 50mg  Observations: routine observations  Legal: 9.39 Involuntary status  Medical: routine medical observations; no acute concerns  Goal: Sx reduction, medication management, safety planning, milieu therapy.  Dispo: pending

## 2021-11-06 NOTE — BH INPATIENT PSYCHIATRY PROGRESS NOTE - CURRENT MEDICATION
MEDICATIONS  (STANDING):  ARIPiprazole 15 milliGRAM(s) Oral daily  diVALproex  milliGRAM(s) Oral two times a day  influenza   Vaccine 0.5 milliLiter(s) IntraMuscular once  melatonin. 5 milliGRAM(s) Oral at bedtime    MEDICATIONS  (PRN):  haloperidol     Tablet 5 milliGRAM(s) Oral every 6 hours PRN agitation  haloperidol    Injectable 5 milliGRAM(s) IntraMuscular Once PRN severe agitation  LORazepam     Tablet 2 milliGRAM(s) Oral every 6 hours PRN Agitation  LORazepam   Injectable 2 milliGRAM(s) IntraMuscular Once PRN severe agitation

## 2021-11-06 NOTE — BH INPATIENT PSYCHIATRY PROGRESS NOTE - NSBHCHARTREVIEWVS_PSY_A_CORE FT
Vital Signs Last 24 Hrs  T(C): 35.8 (11-06-21 @ 05:32), Max: 37.2 (11-05-21 @ 19:30)  T(F): 96.5 (11-06-21 @ 05:32), Max: 98.9 (11-05-21 @ 19:30)  HR: 88 (11-05-21 @ 10:27) (88 - 88)  BP: --  BP(mean): --  RR: --  SpO2: --    Orthostatic VS  11-06-21 @ 05:32  Lying BP: --/-- HR: --  Sitting BP: 116/76 HR: 108  Standing BP: 111/78 HR: 115  Site: upper left arm  Mode: electronic  Orthostatic VS  11-05-21 @ 19:30  Lying BP: --/-- HR: --  Sitting BP: 121/77 HR: 94  Standing BP: 126/76 HR: 101  Site: --  Mode: --  Orthostatic VS  11-05-21 @ 06:20  Lying BP: --/-- HR: --  Sitting BP: 108/85 HR: 122  Standing BP: 108/68 HR: 131  Site: --  Mode: --  Orthostatic VS  11-04-21 @ 19:47  Lying BP: --/-- HR: --  Sitting BP: 133/88 HR: 95  Standing BP: 134/82 HR: 96  Site: --  Mode: --  Orthostatic VS  11-04-21 @ 08:29  Lying BP: --/-- HR: --  Sitting BP: 112/60 HR: --  Standing BP: 107/53 HR: --  Site: --  Mode: electronic   Vital Signs Last 24 Hrs  T(C): 35.8 (11-06-21 @ 05:32), Max: 37.2 (11-05-21 @ 19:30)  T(F): 96.5 (11-06-21 @ 05:32), Max: 98.9 (11-05-21 @ 19:30)  HR: 88 (11-05-21 @ 10:27) (88 - 88)  BP: --  BP(mean): --  RR: --  SpO2: --    Orthostatic VS  11-06-21 @ 05:32  Lying BP: --/-- HR: --  Sitting BP: 116/76 HR: 108  Standing BP: 111/78 HR: 115  Site: upper left arm  Mode: electronic  Orthostatic VS  11-05-21 @ 19:30  Lying BP: --/-- HR: --  Sitting BP: 121/77 HR: 94  Standing BP: 126/76 HR: 101  Site: --  Mode: --  Orthostatic VS  11-05-21 @ 06:20  Lying BP: --/-- HR: --  Sitting BP: 108/85 HR: 122  Standing BP: 108/68 HR: 131  Site: --  Mode: --  Orthostatic VS  11-04-21 @ 19:47  Lying BP: --/-- HR: --  Sitting BP: 133/88 HR: 95  Standing BP: 134/82 HR: 96  Site: --  Mode: --

## 2021-11-06 NOTE — BH INPATIENT PSYCHIATRY PROGRESS NOTE - NSBHFUPINTERVALHXFT_PSY_A_CORE
Patient is followed up for psychosis/aggression at home. Chart, medications and labs reviewed, with no acute findings.  Patient is discussed with nursing staff. Per nursing report patient remains compliant with his medications, no SE noted.  Remains in good behavioral control, no po prns needed in 24HRS, for aggression. Patient reports poor sleep last night “I tossed and turned all night”    Patient is observed in dayroom he is calm, approachable and pleasant.  He describes mood as “ I’m doing fine “I did my breathing yoga exercises this morning” Patient reports doing these exercises “ keeps my energy pure.”  Patient denies negative thought of self-harm, no SI/SIB/HI, engages in safety planning. Patient denies current depressive symptoms including depressed mood, anhedonia, changes in energy/concentration/appetite, sleep disturbances, or feelings of guilt.   Patient AVH and nehemiah, however evidence of thought disorganization, thought blocking, are evident. He is bizarre, paranoid, delusional.  Pt made references of being watched by the government.  Patient is encouraged to continue medication regimen.  Self- care remains fair.  No acute medical concerns, VSS.  Will continue to provide therapeutic support. Continue treatment for risk modification.

## 2021-11-06 NOTE — BH INPATIENT PSYCHIATRY PROGRESS NOTE - NSBHMETABOLIC_PSY_ALL_CORE_FT
BMI:   HbA1c:   Glucose:   BP: 117/69 (11-03-21 @ 13:51) (117/69 - 117/69)  Lipid Panel: Date/Time: 11-04-21 @ 12:08  Cholesterol, Serum: 188  Direct LDL: --  HDL Cholesterol, Serum: 73  Total Cholesterol/HDL Ration Measurement: --  Triglycerides, Serum: 55

## 2021-11-07 PROCEDURE — 99232 SBSQ HOSP IP/OBS MODERATE 35: CPT

## 2021-11-07 NOTE — BH INPATIENT PSYCHIATRY PROGRESS NOTE - NSBHCHARTREVIEWVS_PSY_A_CORE FT
Vital Signs Last 24 Hrs  T(C): 36.4 (11-07-21 @ 06:26), Max: 36.9 (11-06-21 @ 19:36)  T(F): 97.6 (11-07-21 @ 06:26), Max: 98.5 (11-06-21 @ 19:36)  HR: --  BP: --  BP(mean): --  RR: --  SpO2: --    Orthostatic VS  11-06-21 @ 19:36  Lying BP: --/-- HR: --  Sitting BP: 146/68 HR: 74  Standing BP: 147/84 HR: 99  Site: --  Mode: --  Orthostatic VS  11-06-21 @ 05:32  Lying BP: --/-- HR: --  Sitting BP: 116/76 HR: 108  Standing BP: 111/78 HR: 115  Site: upper left arm  Mode: electronic  Orthostatic VS  11-05-21 @ 19:30  Lying BP: --/-- HR: --  Sitting BP: 121/77 HR: 94  Standing BP: 126/76 HR: 101  Site: --  Mode: --   Vital Signs Last 24 Hrs  T(C): 36.4 (11-07-21 @ 06:26), Max: 36.9 (11-06-21 @ 19:36)  T(F): 97.6 (11-07-21 @ 06:26), Max: 98.5 (11-06-21 @ 19:36)  HR: --  BP: --  BP(mean): --  RR: --  SpO2: --    Orthostatic VS  11-07-21 @ 08:10  Lying BP: --/-- HR: --  Sitting BP: 113/81 HR: 94  Standing BP: --/-- HR: --  Site: --  Mode: --  Orthostatic VS  11-06-21 @ 19:36  Lying BP: --/-- HR: --  Sitting BP: 146/68 HR: 74  Standing BP: 147/84 HR: 99  Site: --  Mode: --  Orthostatic VS  11-06-21 @ 05:32  Lying BP: --/-- HR: --  Sitting BP: 116/76 HR: 108  Standing BP: 111/78 HR: 115  Site: upper left arm  Mode: electronic  Orthostatic VS  11-05-21 @ 19:30  Lying BP: --/-- HR: --  Sitting BP: 121/77 HR: 94  Standing BP: 126/76 HR: 101  Site: --  Mode: --

## 2021-11-07 NOTE — BH INPATIENT PSYCHIATRY PROGRESS NOTE - NSBHFUPINTERVALHXFT_PSY_A_CORE
Patient is followed up for psychosis/aggression at home. Chart, medications and labs reviewed, with no acute findings.  Patient is discussed with nursing staff. Per nursing report patient refused his medications last night. Remains in fair behavioral control, no po prns needed in 24HRS, for aggression. Patient reports improved sleep last night without  prn’s for insomnia.    Patient is observed in dayroom he is calm, approachable, pleasant but a bit oddly related.  He describes mood as “fine.”  Patient denies negative thought of self-harm, no SI/SIB/HI, engages in safety planning.  Patient denies AVH and nehemiah, however evidence of thought disorganization, thought blocking, are evident. He is bizarre, paranoid, delusional.  Nursing reports patient refused his evening medications last night. Patient states that medications gives him the hiccups and makes him sick,  he is encouraged to continue medication regimen.  Self- care remains fair.  No acute medical concerns, VSS.  Will continue to provide therapeutic support. Continue treatment for risk modification.

## 2021-11-07 NOTE — BH INPATIENT PSYCHIATRY PROGRESS NOTE - CURRENT MEDICATION
MEDICATIONS  (STANDING):  ARIPiprazole 15 milliGRAM(s) Oral daily  diVALproex  milliGRAM(s) Oral two times a day  influenza   Vaccine 0.5 milliLiter(s) IntraMuscular once  melatonin. 5 milliGRAM(s) Oral at bedtime    MEDICATIONS  (PRN):  haloperidol     Tablet 5 milliGRAM(s) Oral every 6 hours PRN agitation  haloperidol    Injectable 5 milliGRAM(s) IntraMuscular Once PRN severe agitation  LORazepam     Tablet 2 milliGRAM(s) Oral every 6 hours PRN Agitation  LORazepam   Injectable 2 milliGRAM(s) IntraMuscular Once PRN severe agitation  traZODone 50 milliGRAM(s) Oral at bedtime PRN insomnia

## 2021-11-07 NOTE — BH INPATIENT PSYCHIATRY PROGRESS NOTE - NSBHMETABOLIC_PSY_ALL_CORE_FT
BMI:   HbA1c:   Glucose:   BP: --  Lipid Panel: Date/Time: 11-04-21 @ 12:08  Cholesterol, Serum: 188  Direct LDL: --  HDL Cholesterol, Serum: 73  Total Cholesterol/HDL Ration Measurement: --  Triglycerides, Serum: 55

## 2021-11-08 PROCEDURE — 99232 SBSQ HOSP IP/OBS MODERATE 35: CPT

## 2021-11-08 RX ADMIN — ARIPIPRAZOLE 15 MILLIGRAM(S): 15 TABLET ORAL at 11:03

## 2021-11-08 RX ADMIN — DIVALPROEX SODIUM 500 MILLIGRAM(S): 500 TABLET, DELAYED RELEASE ORAL at 21:18

## 2021-11-08 RX ADMIN — DIVALPROEX SODIUM 500 MILLIGRAM(S): 500 TABLET, DELAYED RELEASE ORAL at 11:03

## 2021-11-08 RX ADMIN — Medication 50 MILLIGRAM(S): at 21:18

## 2021-11-08 RX ADMIN — Medication 5 MILLIGRAM(S): at 21:18

## 2021-11-08 NOTE — BH INPATIENT PSYCHIATRY PROGRESS NOTE - NSBHCHARTREVIEWVS_PSY_A_CORE FT
Vital Signs Last 24 Hrs  T(C): 35.8 (11-07-21 @ 22:38), Max: 37.2 (11-07-21 @ 19:28)  T(F): 96.5 (11-07-21 @ 22:38), Max: 98.9 (11-07-21 @ 19:28)  HR: --  BP: --  BP(mean): --  RR: --  SpO2: --    Orthostatic VS  11-07-21 @ 19:28  Lying BP: --/-- HR: --  Sitting BP: 126/81 HR: 82  Standing BP: 144/80 HR: 100  Site: --  Mode: --  Orthostatic VS  11-07-21 @ 08:10  Lying BP: --/-- HR: --  Sitting BP: 113/81 HR: 94  Standing BP: --/-- HR: --  Site: --  Mode: --  Orthostatic VS  11-06-21 @ 19:36  Lying BP: --/-- HR: --  Sitting BP: 146/68 HR: 74  Standing BP: 147/84 HR: 99  Site: --  Mode: --   Vital Signs Last 24 Hrs  T(C): 35.8 (11-07-21 @ 22:38), Max: 37.2 (11-07-21 @ 19:28)  T(F): 96.5 (11-07-21 @ 22:38), Max: 98.9 (11-07-21 @ 19:28)    Orthostatic VS  11-07-21 @ 19:28  Lying BP: --/-- HR: --  Sitting BP: 126/81 HR: 82  Standing BP: 144/80 HR: 100  Site: --  Mode: --  Orthostatic VS  11-07-21 @ 08:10  Lying BP: --/-- HR: --  Sitting BP: 113/81 HR: 94  Standing BP: --/-- HR: --  Site: --  Mode: --  Orthostatic VS  11-06-21 @ 19:36  Lying BP: --/-- HR: --  Sitting BP: 146/68 HR: 74  Standing BP: 147/84 HR: 99  Site: --  Mode: --   Vital Signs Last 24 Hrs  T(C): 35.8 (11-08-21 @ 14:33), Max: 37.2 (11-07-21 @ 19:28)  T(F): 96.5 (11-08-21 @ 14:33), Max: 98.9 (11-07-21 @ 19:28)  Orthostatic VS  11-07-21 @ 19:28  Lying BP: --/-- HR: --  Sitting BP: 126/81 HR: 82  Standing BP: 144/80 HR: 100  Site: --  Mode: --  Orthostatic VS  11-07-21 @ 08:10  Lying BP: --/-- HR: --  Sitting BP: 113/81 HR: 94  Standing BP: --/-- HR: --  Site: --  Mode: --  Orthostatic VS  11-06-21 @ 19:36  Lying BP: --/-- HR: --  Sitting BP: 146/68 HR: 74  Standing BP: 147/84 HR: 99  Site: --  Mode: --

## 2021-11-08 NOTE — BH INPATIENT PSYCHIATRY PROGRESS NOTE - OTHER
odd, impaired guarded Preoccupied with energy, chakra, yoga, qigong, and nature Voice from the "divine mother" telling him not to take medications

## 2021-11-08 NOTE — BH INPATIENT PSYCHIATRY PROGRESS NOTE - NSBHASSESSSUMMFT_PSY_ALL_CORE
The patient is 27 yo single, unemployed, domiciled with parents, non-caregiver, with a history of schizoaffective disorder, with multiple inpatient admissions, most recently "couple of years ago" , non-compliant with his treatment and meds, No history of suicidal attempts or physical aggression noted, no legal involvement, no history pf arrests, history of substance abuse, namely alcohol, K2,  and cannabis; denies recent use. Brought into the ER by EMS this evening after having an argument with his father "because he did not give me money for my birthday". Patient called 911 himself.    The patient is delusional, bizarre, and is experiencing AH in the context of lack of treatment on admission. Endorses AH today about "divine mother" telling him not to take medications. Mood instability demonstrated by aggression at home and per collateral. The patient is in behavioral control on the unit, however, is aggressive once decompensated. He is psychotically ambivalent, is guarded and minimizing symptoms. Denies SI/HI/i/p. Denies visual hallucinations. The patient is agreeable to Abilify and Depakote; is now refusing Risperdal. Refused medications over the weekend.     Plan:  Psychiatry: D/C Risperdal; Abilify 15mg, Depakote DR 500mg, melatonin 5mg- VPA level Monday  PRN medications: Haldol  5mg, Ativan 2mg, Benadryl 50mg  Observations: routine observations  Legal: 9.39 Involuntary status  Medical: routine medical observations; no acute concerns  Goal: Sx reduction, medication management, safety planning, milieu therapy.  Dispo: pending The patient is 27 yo single, unemployed, domiciled with parents, non-caregiver, with a history of schizoaffective disorder, with multiple inpatient admissions, most recently "couple of years ago" , non-compliant with his treatment and meds, No history of suicidal attempts or physical aggression noted, no legal involvement, no history pf arrests, history of substance abuse, namely alcohol, K2,  and cannabis; denies recent use. Brought into the ER by EMS this evening after having an argument with his father "because he did not give me money for my birthday". Patient called 911 himself.    The patient is delusional, bizarre, and is experiencing AH in the context of lack of treatment on admission. Endorses AH today about "divine mother" telling him not to take medications. Mood instability demonstrated by aggression at home and per collateral. The patient is in behavioral control on the unit, however, is aggressive once decompensated. He is psychotically ambivalent, is guarded and minimizing symptoms. Denies SI/HI/i/p. Denies visual hallucinations. The patient initially refused medications today, later accepting the, after discussing potential for MOO. Family meeting planned for tomorrow.    Plan:  Psychiatry: D/C Risperdal; Abilify 15mg, Depakote DR 500mg, melatonin 5mg- VPA level later this week as the pt. refused medications and blood work this morning.  PRN medications: Haldol  5mg, Ativan 2mg, Benadryl 50mg  Observations: routine observations  Legal: 9.39 Involuntary status  Medical: routine medical observations; no acute concerns  Goal: Sx reduction, medication management, safety planning, milieu therapy.  Dispo: pending

## 2021-11-08 NOTE — BH INPATIENT PSYCHIATRY PROGRESS NOTE - NSBHFUPINTERVALHXFT_PSY_A_CORE
Patient is followed up for psychosis/aggression at home. Chart, medications, and labs reviewed with team, with no acute findings. Patient was seen by NP and medical student (writer). Per nursing staff, patient refused medications last night but remained in fair behavioral control, no PO PRNs needed for aggression in the last 24hrs. Patient was seen in his room; he was calm, approachable, pleasant, but oddly related. He reports waking up early in the morning to do "qigong" but otherwise slept well last night without PRN sleep meds. His mood is "fine." He reports not taking his medications because they make him feel sick, nauseous, + vomiting. He states that doing yoga, qigong, and breathing exercises makes him feel better than taking the medications. States that he is "ready to leave the hospital." Patient endorses hearing voices from the "divine mother" from his heart telling him that he does not need medications. Denies auditory hallucinations and delusions. Denies SI/HI/i/p. Thought is disorganized. He is bizarre, paranoid, delusional, and grandiose. 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    Will continue Abilify 15mg QD and Depakote 500mg BID. Encouraged patient to take medications despite meds making him feel "sick." Took medications this afternoon after reassurance from the team. Plan for family meeting tomorrow to discuss treatment plan and adherence.  Patient is followed up for psychosis/aggression at home. Chart, medications, and labs reviewed with team, with no acute findings. Patient was seen by NP and medical student (writer). Per nursing staff, patient refused medications last night but remained in fair behavioral control, no PO PRNs needed for aggression in the last 24hrs. Patient was seen in his room; he was calm, approachable, pleasant, but oddly related. He reports waking up early in the morning to do "qigong" but otherwise slept well last night without PRN sleep meds. His mood is "fine." He reports not taking his medications because they make him feel sick, nauseous, + vomiting. He states that doing yoga, qigong, and breathing exercises makes him feel better than taking the medications. States that he is "ready to leave the hospital." Patient endorses hearing voices from the "divine mother" from his heart telling him that he does not need medications. Denies auditory hallucinations and delusions. Denies SI/HI/i/p. Thought is disorganized. He is bizarre, paranoid, delusional, and grandiose. 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    Will continue Abilify 15mg QD and Depakote 500mg BID. Encouraged patient to take medications despite meds making him feel "sick." Took medications this afternoon after reassurance from the team. Plan for family meeting tomorrow to discuss treatment plan and adherence. Pt. refused blood work.

## 2021-11-08 NOTE — BH INPATIENT PSYCHIATRY PROGRESS NOTE - CURRENT MEDICATION
MEDICATIONS  (STANDING):  ARIPiprazole 15 milliGRAM(s) Oral daily  diVALproex  milliGRAM(s) Oral two times a day  melatonin. 5 milliGRAM(s) Oral at bedtime    MEDICATIONS  (PRN):  haloperidol     Tablet 5 milliGRAM(s) Oral every 6 hours PRN agitation  haloperidol    Injectable 5 milliGRAM(s) IntraMuscular Once PRN severe agitation  LORazepam     Tablet 2 milliGRAM(s) Oral every 6 hours PRN Agitation  LORazepam   Injectable 2 milliGRAM(s) IntraMuscular Once PRN severe agitation  traZODone 50 milliGRAM(s) Oral at bedtime PRN insomnia

## 2021-11-09 PROCEDURE — 99232 SBSQ HOSP IP/OBS MODERATE 35: CPT

## 2021-11-09 RX ORDER — RISPERIDONE 4 MG/1
1 TABLET ORAL
Refills: 0 | Status: DISCONTINUED | OUTPATIENT
Start: 2021-11-09 | End: 2021-11-10

## 2021-11-09 RX ADMIN — ARIPIPRAZOLE 15 MILLIGRAM(S): 15 TABLET ORAL at 09:42

## 2021-11-09 RX ADMIN — DIVALPROEX SODIUM 500 MILLIGRAM(S): 500 TABLET, DELAYED RELEASE ORAL at 20:27

## 2021-11-09 RX ADMIN — RISPERIDONE 1 MILLIGRAM(S): 4 TABLET ORAL at 20:27

## 2021-11-09 RX ADMIN — Medication 50 MILLIGRAM(S): at 20:28

## 2021-11-09 RX ADMIN — Medication 5 MILLIGRAM(S): at 20:28

## 2021-11-09 RX ADMIN — DIVALPROEX SODIUM 500 MILLIGRAM(S): 500 TABLET, DELAYED RELEASE ORAL at 09:42

## 2021-11-09 NOTE — BH INPATIENT PSYCHIATRY PROGRESS NOTE - OTHER
Voice from the "divine mother" telling him not to take medications odd, impaired Preoccupied with energy, chakra, yoga, qigong, and nature guarded

## 2021-11-09 NOTE — BH INPATIENT PSYCHIATRY PROGRESS NOTE - NSBHFUPINTERVALHXFT_PSY_A_CORE
Patient is followed up for psychosis/aggression at home. Chart, medications, and labs reviewed with team, with no acute findings. Patient was seen by NP and medical student (writer). Per nursing staff, patient was adherent to medications yesterday despite initial hesitancy; no PO PRNs needed for aggression in the last 24hrs. Patient was seen in his room; he was calm, approachable, pleasant, but oddly related. He remains bizarre, paranoid, delusional, and grandiose. Mood is "fine." He reports feeling tired this morning and could not do as much qigong as he wanted to, was only able to do qigong for 30 minutes instead of usual 1 hour. Reports overall tired of being in the hospital and is fixated on discharge. Sleep well last night after taking PRN Trazodone 50mg HS and melatonin 5mg HS. No longer reports that his standing medications are making him feel sick, nauseous, or vomiting. Verbalized "maybe the symptoms are all in my head" in regard to his reaction to the medications. He states that he is willing to take his medications as long as that will facilitate faster discharge. However still states that doing yoga, qigong, and breathing exercises makes him feel better than taking the medications. Still endorses hearing voices from the "divine mother," but no command hallucinations and unable to elicit if these are actually AH. Denies visual hallucinations and delusions. Denies SI/HI/i/p. Thought is disorganized. Staff observed patient talking to self but patient reassured writer that he was just "chanting mantras." During family meeting today, patient was able to compromise with his parents that he can return to the house if he is compliant with medications. Fixated on discharge during the conversation and cried when team told him that discharge today was not possible. Mentioned traumatic experiences in psychiatric hospitals in the past but will not elaborate. Patient mentioned during meeting that he was on Risperidone previously without adverse effects and is willing to restart it. However, he is not amendable to ONEILL, despite history of non-adherence to medications.     Will taper Abilify and start on Risperidone instead, continue with Depakote 500mg BID. Patient was made aware that medication adherence is necessary for discharge planning. Patient is currently adherent to medications.

## 2021-11-09 NOTE — BH INPATIENT PSYCHIATRY PROGRESS NOTE - NSBHMETABOLIC_PSY_ALL_CORE_FT
BMI:   HbA1c:   Glucose:   BP: 116/79 (11-09-21 @ 08:36) (116/79 - 116/79)  Lipid Panel: Date/Time: 11-04-21 @ 12:08  Cholesterol, Serum: 188  Direct LDL: --  HDL Cholesterol, Serum: 73  Total Cholesterol/HDL Ration Measurement: --  Triglycerides, Serum: 55

## 2021-11-09 NOTE — BH INPATIENT PSYCHIATRY PROGRESS NOTE - CURRENT MEDICATION
MEDICATIONS  (STANDING):  ARIPiprazole 15 milliGRAM(s) Oral daily  diVALproex  milliGRAM(s) Oral two times a day  melatonin. 5 milliGRAM(s) Oral at bedtime    MEDICATIONS  (PRN):  haloperidol     Tablet 5 milliGRAM(s) Oral every 6 hours PRN agitation  haloperidol    Injectable 5 milliGRAM(s) IntraMuscular Once PRN severe agitation  LORazepam     Tablet 2 milliGRAM(s) Oral every 6 hours PRN Agitation  LORazepam   Injectable 2 milliGRAM(s) IntraMuscular Once PRN severe agitation  traZODone 50 milliGRAM(s) Oral at bedtime PRN insomnia   MEDICATIONS  (STANDING):  diVALproex  milliGRAM(s) Oral two times a day  melatonin. 5 milliGRAM(s) Oral at bedtime  risperiDONE  Disintegrating Tablet 1 milliGRAM(s) Oral two times a day    MEDICATIONS  (PRN):  haloperidol     Tablet 5 milliGRAM(s) Oral every 6 hours PRN agitation  haloperidol    Injectable 5 milliGRAM(s) IntraMuscular Once PRN severe agitation  LORazepam     Tablet 2 milliGRAM(s) Oral every 6 hours PRN Agitation  LORazepam   Injectable 2 milliGRAM(s) IntraMuscular Once PRN severe agitation  traZODone 50 milliGRAM(s) Oral at bedtime PRN insomnia

## 2021-11-09 NOTE — BH INPATIENT PSYCHIATRY PROGRESS NOTE - NSBHASSESSSUMMFT_PSY_ALL_CORE
The patient is 27 yo single, unemployed, domiciled with parents, non-caregiver, with a history of schizoaffective disorder, with multiple inpatient admissions, most recently "couple of years ago" , non-compliant with his treatment and meds, No history of suicidal attempts or physical aggression noted, no legal involvement, no history pf arrests, history of substance abuse, namely alcohol, K2,  and cannabis; denies recent use. Brought into the ER by EMS this evening after having an argument with his father "because he did not give me money for my birthday". Patient called 911 himself.    The patient is delusional, bizarre, and is experiencing AH in the context of lack of treatment on admission. Endorses AH today about "divine mother," but denies command auditory hallucinations. Mood instability demonstrated by aggression at home, per collateral, and during family meeting today when he cried and became extremely agitated about discharge planning. The patient is in behavioral control on the unit, however, is aggressive once decompensated. He is psychotically ambivalent, is guarded and minimizing symptoms. Denies SI/HI/i/p. Denies visual hallucinations. Patient is currently adherent to medications after family meeting today.     Plan:  Psychiatry: Taper Abilify and start Risperdal. Continue Depakote DR 500mg BID, melatonin 5mg- VPA level later this week.  PRN medications: Haldol  5mg, Ativan 2mg, Benadryl 50mg  Observations: routine observations  Legal: 9.39 Involuntary status  Medical: routine medical observations; no acute concerns  Goal: Sx reduction, medication management, safety planning, milieu therapy.  Dispo: pending The patient is 25 yo single, unemployed, domiciled with parents, non-caregiver, with a history of schizoaffective disorder, with multiple inpatient admissions, most recently "couple of years ago" , non-compliant with his treatment and meds, No history of suicidal attempts or physical aggression noted, no legal involvement, no history pf arrests, history of substance abuse, namely alcohol, K2,  and cannabis; denies recent use. Brought into the ER by EMS this evening after having an argument with his father "because he did not give me money for my birthday". Patient called 911 himself.    The patient is delusional, bizarre. Family conference call held today and the pt. agreed to take PO medications and restart Risperdal. He was observed crying after the meeting in response to having to remain hospitalized. The patient is in behavioral control on the unit, however, is aggressive once decompensated. He is psychotically ambivalent, is guarded and minimizing symptoms. Denies SI/HI/i/p. Denies visual hallucinations. Patient is currently adherent to medications after family meeting today.     Plan:  Psychiatry: D/C Abilify and start Risperdal Mtab 1mg BID. Continue Depakote DR 500mg BID, melatonin 5mg- VPA level tomorrow morning  PRN medications: Haldol  5mg, Ativan 2mg, Benadryl 50mg  Observations: routine observations  Legal: 9.39 Involuntary status  Medical: routine medical observations; no acute concerns  Goal: Sx reduction, medication management, safety planning, milieu therapy.  Dispo: pending

## 2021-11-09 NOTE — BH INPATIENT PSYCHIATRY PROGRESS NOTE - NSBHCHARTREVIEWVS_PSY_A_CORE FT
Vital Signs Last 24 Hrs  T(C): 35.9 (11-09-21 @ 06:22), Max: 37 (11-08-21 @ 22:49)  T(F): 96.7 (11-09-21 @ 06:22), Max: 98.6 (11-08-21 @ 22:49)  HR: --  BP: 116/79 (11-09-21 @ 08:36) (116/79 - 116/79)  BP(mean): 105 (11-09-21 @ 08:36) (105 - 105)  RR: --  SpO2: --    Orthostatic VS  11-08-21 @ 19:55  Lying BP: --/-- HR: --  Sitting BP: 147/75 HR: 85  Standing BP: 124/82 HR: 94  Site: --  Mode: --  Orthostatic VS  11-07-21 @ 19:28  Lying BP: --/-- HR: --  Sitting BP: 126/81 HR: 82  Standing BP: 144/80 HR: 100  Site: --  Mode: --   Vital Signs Last 24 Hrs  T(C): 36.6 (11-09-21 @ 14:11), Max: 37 (11-08-21 @ 22:49)  T(F): 97.8 (11-09-21 @ 14:11), Max: 98.6 (11-08-21 @ 22:49)  HR: --  BP: 116/79 (11-09-21 @ 08:36) (116/79 - 116/79)  BP(mean): 105 (11-09-21 @ 08:36) (105 - 105)  RR: --  SpO2: --    Orthostatic VS  11-08-21 @ 19:55  Lying BP: --/-- HR: --  Sitting BP: 147/75 HR: 85  Standing BP: 124/82 HR: 94  Site: --  Mode: --  Orthostatic VS  11-07-21 @ 19:28  Lying BP: --/-- HR: --  Sitting BP: 126/81 HR: 82  Standing BP: 144/80 HR: 100  Site: --  Mode: --

## 2021-11-10 LAB
A1C WITH ESTIMATED AVERAGE GLUCOSE RESULT: 5.6 % — SIGNIFICANT CHANGE UP (ref 4–5.6)
CHOLEST SERPL-MCNC: 171 MG/DL — SIGNIFICANT CHANGE UP
ESTIMATED AVERAGE GLUCOSE: 114 — SIGNIFICANT CHANGE UP
HDLC SERPL-MCNC: 69 MG/DL — SIGNIFICANT CHANGE UP
LIPID PNL WITH DIRECT LDL SERPL: 92 MG/DL — SIGNIFICANT CHANGE UP
NON HDL CHOLESTEROL: 102 MG/DL — SIGNIFICANT CHANGE UP
TRIGL SERPL-MCNC: 51 MG/DL — SIGNIFICANT CHANGE UP
VALPROATE SERPL-MCNC: 90.8 UG/ML — SIGNIFICANT CHANGE UP (ref 50–100)

## 2021-11-10 RX ORDER — RISPERIDONE 4 MG/1
2 TABLET ORAL DAILY
Refills: 0 | Status: DISCONTINUED | OUTPATIENT
Start: 2021-11-10 | End: 2021-11-11

## 2021-11-10 RX ORDER — RISPERIDONE 4 MG/1
1 TABLET ORAL
Refills: 0 | Status: DISCONTINUED | OUTPATIENT
Start: 2021-11-10 | End: 2021-11-11

## 2021-11-10 RX ADMIN — DIVALPROEX SODIUM 500 MILLIGRAM(S): 500 TABLET, DELAYED RELEASE ORAL at 21:52

## 2021-11-10 RX ADMIN — Medication 5 MILLIGRAM(S): at 21:51

## 2021-11-10 RX ADMIN — RISPERIDONE 1 MILLIGRAM(S): 4 TABLET ORAL at 21:51

## 2021-11-10 RX ADMIN — RISPERIDONE 1 MILLIGRAM(S): 4 TABLET ORAL at 09:08

## 2021-11-10 RX ADMIN — DIVALPROEX SODIUM 500 MILLIGRAM(S): 500 TABLET, DELAYED RELEASE ORAL at 09:08

## 2021-11-10 NOTE — BH INPATIENT PSYCHIATRY PROGRESS NOTE - NSBHCHARTREVIEWVS_PSY_A_CORE FT
Vital Signs Last 24 Hrs  T(C): 36.9 (11-10-21 @ 06:23), Max: 37.3 (11-09-21 @ 19:23)  T(F): 98.5 (11-10-21 @ 06:23), Max: 99.2 (11-09-21 @ 19:23)  HR: --  BP: --  BP(mean): --  RR: --  SpO2: --    Orthostatic VS  11-10-21 @ 06:23  Lying BP: --/-- HR: --  Sitting BP: 135/75 HR: 95  Standing BP: 122/87 HR: 93  Site: --  Mode: --  Orthostatic VS  11-09-21 @ 19:23  Lying BP: --/-- HR: --  Sitting BP: 120/84 HR: 92  Standing BP: 127/80 HR: 98  Site: --  Mode: --  Orthostatic VS  11-08-21 @ 19:55  Lying BP: --/-- HR: --  Sitting BP: 147/75 HR: 85  Standing BP: 124/82 HR: 94  Site: --  Mode: --

## 2021-11-10 NOTE — BH INPATIENT PSYCHIATRY PROGRESS NOTE - NSBHFUPINTERVALHXFT_PSY_A_CORE
Patient is followed up for psychosis/aggression at home. Chart, medications, and labs reviewed with team, with no acute findings. Patient was seen by NP and medical student (writer). Per nursing staff, patient was adherent to medications yesterday; no PO PRNs needed for aggression in the last 24hrs. Patient was seen in his room; he was calm, approachable, pleasant, but oddly related. He remains bizarre, paranoid, delusional, and grandiose. Mood is "good." He reports feeling tired this morning. Stated that he woke up at midnight to do 2 hours of yoga in the dayroom last night. States that he feels "destressed" after doing yoga. Otherwise, slept well after taking PRN Trazodone 50mg HS and  melatonin 5mg HS. No longer reports that his standing medications are making him feel sick, nauseous, or vomiting. Verbalized "maybe the symptoms are all in my head" in regard to his reaction to the medications. He states that he is willing to take his medications as long as that will facilitate faster discharge. However still states that doing yoga, qigong, and breathing exercises makes him feel better than taking the medications. Denies AVH and hearing voices from the "divine mother" in the past 24hrs. Denies delusions. Denies SI/HI/i/p. Thought is disorganized. Staff observed patient talking to self but patient reassured writer that he was just "chanting mantras." Still fixated on discharge.    Will taper Abilify and start on Risperidone instead, continue with Depakote 500mg BID. Patient was made aware that medication adherence is necessary for discharge planning. Patient is currently adherent to medications.

## 2021-11-10 NOTE — BH INPATIENT PSYCHIATRY PROGRESS NOTE - NSBHMETABOLIC_PSY_ALL_CORE_FT
BMI:   HbA1c:   Glucose:   BP: 116/79 (11-09-21 @ 08:36) (116/79 - 116/79)  Lipid Panel: Date/Time: 11-10-21 @ 09:45  Cholesterol, Serum: 171  Direct LDL: --  HDL Cholesterol, Serum: 69  Total Cholesterol/HDL Ration Measurement: --  Triglycerides, Serum: 51   BMI:   HbA1c: A1C with Estimated Average Glucose Result: 5.6 % (11-10-21 @ 09:45)    Glucose:   BP: 116/79 (11-09-21 @ 08:36) (116/79 - 116/79)  Lipid Panel: Date/Time: 11-10-21 @ 09:45  Cholesterol, Serum: 171  Direct LDL: --  HDL Cholesterol, Serum: 69  Total Cholesterol/HDL Ration Measurement: --  Triglycerides, Serum: 51

## 2021-11-10 NOTE — BH INPATIENT PSYCHIATRY PROGRESS NOTE - CURRENT MEDICATION
MEDICATIONS  (STANDING):  diVALproex  milliGRAM(s) Oral two times a day  melatonin. 5 milliGRAM(s) Oral at bedtime  risperiDONE  Disintegrating Tablet 1 milliGRAM(s) Oral two times a day    MEDICATIONS  (PRN):  haloperidol     Tablet 5 milliGRAM(s) Oral every 6 hours PRN agitation  haloperidol    Injectable 5 milliGRAM(s) IntraMuscular Once PRN severe agitation  LORazepam     Tablet 2 milliGRAM(s) Oral every 6 hours PRN Agitation  LORazepam   Injectable 2 milliGRAM(s) IntraMuscular Once PRN severe agitation  traZODone 50 milliGRAM(s) Oral at bedtime PRN insomnia

## 2021-11-10 NOTE — BH INPATIENT PSYCHIATRY PROGRESS NOTE - NSBHASSESSSUMMFT_PSY_ALL_CORE
The patient is 27 yo single, unemployed, domiciled with parents, non-caregiver, with a history of schizoaffective disorder, with multiple inpatient admissions, most recently "couple of years ago" , non-compliant with his treatment and meds, No history of suicidal attempts or physical aggression noted, no legal involvement, no history pf arrests, history of substance abuse, namely alcohol, K2,  and cannabis; denies recent use. Brought into the ER by EMS this evening after having an argument with his father "because he did not give me money for my birthday". Patient called 911 himself.    The patient is delusional, bizarre. Family conference call yesterday and the pt. agreed to take PO medications and restart Risperdal. He was observed crying after the meeting in response to having to remain hospitalized. The patient is in behavioral control on the unit, however, is aggressive once decompensated. He is psychotically ambivalent, is guarded and minimizing symptoms. Denies SI/HI/i/p. Denies auditory and visual hallucinations. Patient is currently adherent to medications.     Plan:  Psychiatry: D/C Abilify and start Risperdal Mtab 1mg BID. Continue Depakote DR 500mg BID, melatonin 5mg- VPA level tomorrow morning  PRN medications: Haldol  5mg, Ativan 2mg, Benadryl 50mg  Observations: routine observations  Legal: 9.39 Involuntary status  Medical: routine medical observations; no acute concerns  Goal: Sx reduction, medication management, safety planning, milieu therapy.  Dispo: pending

## 2021-11-11 RX ORDER — RISPERIDONE 4 MG/1
2 TABLET ORAL AT BEDTIME
Refills: 0 | Status: DISCONTINUED | OUTPATIENT
Start: 2021-11-11 | End: 2021-11-12

## 2021-11-11 RX ADMIN — RISPERIDONE 1 MILLIGRAM(S): 4 TABLET ORAL at 09:10

## 2021-11-11 RX ADMIN — RISPERIDONE 2 MILLIGRAM(S): 4 TABLET ORAL at 09:10

## 2021-11-11 RX ADMIN — RISPERIDONE 2 MILLIGRAM(S): 4 TABLET ORAL at 22:21

## 2021-11-11 RX ADMIN — DIVALPROEX SODIUM 500 MILLIGRAM(S): 500 TABLET, DELAYED RELEASE ORAL at 22:22

## 2021-11-11 RX ADMIN — DIVALPROEX SODIUM 500 MILLIGRAM(S): 500 TABLET, DELAYED RELEASE ORAL at 09:11

## 2021-11-11 RX ADMIN — Medication 5 MILLIGRAM(S): at 22:22

## 2021-11-11 NOTE — BH INPATIENT PSYCHIATRY PROGRESS NOTE - NSBHMSETHTPROC_PSY_A_CORE
Disorganized/Circumstantial/Tangential/Illogical/Impaired reasoning Linear/Circumstantial/Tangential/Illogical/Other

## 2021-11-11 NOTE — BH INPATIENT PSYCHIATRY PROGRESS NOTE - OTHER
guarded odd, impaired Preoccupied with energy, chakra, yoga, qigong, and nature Preoccupied with energy, chakra, yoga, qigong, and nature  Talking with self  reasoning improving pt. denies AH, however is engaging in self-talk, appears internally pre-occupied at times grossly loose but improving

## 2021-11-11 NOTE — BH INPATIENT PSYCHIATRY PROGRESS NOTE - NSBHFUPINTERVALHXFT_PSY_A_CORE
Patient is followed up for psychosis/aggression at home. Chart, medications, and labs reviewed with team, with no acute findings. Patient was seen by NP and medical student (writer). No PO PRNs needed for aggression in the last 24hrs. Patient was seen in his room; he was calm, approachable, pleasant, but oddly related. He remains bizarre, paranoid, delusional, and grandiose. Mood is "good." When writer entered patient's room this morning, patient was observed to be talking to himself. Nursing staff also report patient talking to self. Patient denied talking to self and is actually "chanting mantras." Reports waking up at midnight to do 2 hours of yoga in the dayroom last night before returning to sleep. Nursing staff reports that patient said "I do yoga to stop hearing the voices." However, patient denies AVH when asked by writer, not hearing voices from the "divine mother." Patient is likely minimizing symptoms. Patient also reports feeling anxious about staying in the unit over the weekend, but did not identify any stressors. Reports sleeping well after taking only melatonin 5mg HS. No longer reports that his standing medications are making him feel sick, nauseous, or vomiting. He states that he is willing to take his medications as long as that will facilitate faster discharge. However still states that doing yoga, qigong, and breathing exercises makes him feel better than taking the medications. Denies delusions. Denies SI/HI/i/p. Thought is disorganized. Less fixated on discharge today.     Patient is currently adherent to medications. Patient is currently taking Risperdal 2mg QD and Depakote 500mg BID. Discontinued Abilify. Patient was made aware that medication adherence is necessary for discharge planning.  Patient is followed up for psychosis/aggression at home. Chart, medications, and labs reviewed with team, with no acute findings. Patient was seen by NP and medical student (writer). No PO PRNs needed for aggression in the last 24hrs. Patient was seen in his room; he was calm, approachable, pleasant, but oddly related. He remains bizarre, paranoid, delusional, and grandiose. Mood is "good." When writer entered patient's room this morning, patient was observed to be talking to himself. Nursing staff also report patient talking to self. Patient denied talking to self and is actually "chanting mantras." Reports waking up at midnight to do 2 hours of yoga in the dayroom last night before returning to sleep. Nursing staff reports that patient said "I do yoga to stop hearing the voices." However, patient denies AVH when asked by writer, not hearing voices from the "divine mother." Patient is likely minimizing symptoms. Patient also reports feeling anxious about staying in the unit over the weekend, but did not identify any stressors. Reports sleeping well after taking only melatonin 5mg HS. No longer reports that his standing medications are making him feel sick, nauseous, or vomiting. He states that he is willing to take his medications as long as that will facilitate faster discharge. However still states that doing yoga, qigong, and breathing exercises makes him feel better than taking the medications. Denies delusions. Denies SI/HI/i/p. Thought is disorganized. Less fixated on discharge today.     Patient is currently adherent to medications. Patient is currently taking Risperdal 2mg BID and Depakote 500mg BID. Discontinued Abilify. Patient was made aware that medication adherence is necessary for discharge planning.

## 2021-11-11 NOTE — BH INPATIENT PSYCHIATRY PROGRESS NOTE - CURRENT MEDICATION
MEDICATIONS  (STANDING):  diVALproex  milliGRAM(s) Oral two times a day  melatonin. 5 milliGRAM(s) Oral at bedtime  risperiDONE  Disintegrating Tablet 1 milliGRAM(s) Oral two times a day  risperiDONE  Disintegrating Tablet 2 milliGRAM(s) Oral daily    MEDICATIONS  (PRN):  haloperidol     Tablet 5 milliGRAM(s) Oral every 6 hours PRN agitation  haloperidol    Injectable 5 milliGRAM(s) IntraMuscular Once PRN severe agitation  LORazepam     Tablet 2 milliGRAM(s) Oral every 6 hours PRN Agitation  LORazepam   Injectable 2 milliGRAM(s) IntraMuscular Once PRN severe agitation  traZODone 50 milliGRAM(s) Oral at bedtime PRN insomnia

## 2021-11-11 NOTE — BH INPATIENT PSYCHIATRY PROGRESS NOTE - NSBHCHARTREVIEWVS_PSY_A_CORE FT
Vital Signs Last 24 Hrs  T(C): 36.3 (11-11-21 @ 06:21), Max: 37 (11-10-21 @ 22:35)  T(F): 97.4 (11-11-21 @ 06:21), Max: 98.6 (11-10-21 @ 22:35)  HR: --  BP: --  BP(mean): --  RR: --  SpO2: --    Orthostatic VS  11-11-21 @ 09:05  Lying BP: --/-- HR: --  Sitting BP: 136/73 HR: 81  Standing BP: 133/92 HR: 97  Site: --  Mode: --  Orthostatic VS  11-10-21 @ 06:23  Lying BP: --/-- HR: --  Sitting BP: 135/75 HR: 95  Standing BP: 122/87 HR: 93  Site: --  Mode: --  Orthostatic VS  11-09-21 @ 19:23  Lying BP: --/-- HR: --  Sitting BP: 120/84 HR: 92  Standing BP: 127/80 HR: 98  Site: --  Mode: --   Vital Signs Last 24 Hrs  T(C): 36.3 (11-11-21 @ 06:21), Max: 37 (11-10-21 @ 22:35)  T(F): 97.4 (11-11-21 @ 06:21), Max: 98.6 (11-10-21 @ 22:35)    Orthostatic VS  11-11-21 @ 09:05  Lying BP: --/-- HR: --  Sitting BP: 136/73 HR: 81  Standing BP: 133/92 HR: 97  Site: --  Mode: --  Orthostatic VS  11-10-21 @ 06:23  Lying BP: --/-- HR: --  Sitting BP: 135/75 HR: 95  Standing BP: 122/87 HR: 93  Site: --  Mode: --  Orthostatic VS  11-09-21 @ 19:23  Lying BP: --/-- HR: --  Sitting BP: 120/84 HR: 92  Standing BP: 127/80 HR: 98  Site: --  Mode: --   Vital Signs Last 24 Hrs  T(C): 36.6 (11-11-21 @ 14:35), Max: 37 (11-10-21 @ 22:35)  T(F): 97.9 (11-11-21 @ 14:35), Max: 98.6 (11-10-21 @ 22:35)    Orthostatic VS  11-11-21 @ 09:05  Lying BP: --/-- HR: --  Sitting BP: 136/73 HR: 81  Standing BP: 133/92 HR: 97  Site: --  Mode: --  Orthostatic VS  11-10-21 @ 06:23  Lying BP: --/-- HR: --  Sitting BP: 135/75 HR: 95  Standing BP: 122/87 HR: 93  Site: --  Mode: --  Orthostatic VS  11-09-21 @ 19:23  Lying BP: --/-- HR: --  Sitting BP: 120/84 HR: 92  Standing BP: 127/80 HR: 98  Site: --  Mode: --

## 2021-11-11 NOTE — BH INPATIENT PSYCHIATRY PROGRESS NOTE - NSBHMETABOLIC_PSY_ALL_CORE_FT
BMI:   HbA1c: A1C with Estimated Average Glucose Result: 5.6 % (11-10-21 @ 09:45)    Glucose:   BP: 116/79 (11-09-21 @ 08:36) (116/79 - 116/79)  Lipid Panel: Date/Time: 11-10-21 @ 09:45  Cholesterol, Serum: 171  Direct LDL: --  HDL Cholesterol, Serum: 69  Total Cholesterol/HDL Ration Measurement: --  Triglycerides, Serum: 51

## 2021-11-11 NOTE — BH INPATIENT PSYCHIATRY PROGRESS NOTE - NSBHASSESSSUMMFT_PSY_ALL_CORE
The patient is 25 yo single, unemployed, domiciled with parents, non-caregiver, with a history of schizoaffective disorder, with multiple inpatient admissions, most recently "couple of years ago" , non-compliant with his treatment and meds, No history of suicidal attempts or physical aggression noted, no legal involvement, no history pf arrests, history of substance abuse, namely alcohol, K2,  and cannabis; denies recent use. Brought into the ER by EMS this evening after having an argument with his father "because he did not give me money for my birthday". Patient called 911 himself.    The patient is delusional, odd, and bizarre. During family conference call earlier this week, the patient agreed to take PO medications and restart Risperdal. He was observed crying after the meeting in response to having to remain hospitalized. The patient is in behavioral control on the unit, however, is aggressive once decompensated. He is psychotically ambivalent, is guarded and minimizing symptoms. Denies SI/HI/i/p. Denies auditory and visual hallucinations. However, appears to be internally preoccupied and observed by staff to be talking to self. Patient is currently adherent to medications.     Plan:  Psychiatry: D/C Abilify. Continue Risperdal 2mg QD, Depakote DR 500mg BID, melatonin 5mg. 10/10: VPA 90.80  PRN medications: Haldol  5mg, Ativan 2mg, Benadryl 50mg  Observations: routine observations  Legal: 9.39 Involuntary status  Medical: routine medical observations; no acute concerns  Goal: Sx reduction, medication management, safety planning, milieu therapy.  Dispo: pending The patient is 25 yo single, unemployed, domiciled with parents, non-caregiver, with a history of schizoaffective disorder, with multiple inpatient admissions, most recently "couple of years ago" , non-compliant with his treatment and meds, No history of suicidal attempts or physical aggression noted, no legal involvement, no history pf arrests, history of substance abuse, namely alcohol, K2,  and cannabis; denies recent use. Brought into the ER by EMS this evening after having an argument with his father "because he did not give me money for my birthday". Patient called 911 himself.    The patient is delusional, engaging in self-talk, odd and bizarre; however, has improved overall. During family conference call earlier this week, the patient agreed to take PO medications and restart Risperdal.  The patient is in behavioral control on the unit, however, is aggressive once decompensated. He is psychotically ambivalent, is guarded and minimizing symptoms. Denies SI/HI/i/p. Denies auditory and visual hallucinations. Patient is currently adherent to medications. Will titrate Risperdal.     Plan:  Psychiatry: D/C Abilify. Continue Risperdal 2mg BID, Depakote DR 500mg BID, melatonin 5mg. 10/10: VPA 90.80  PRN medications: Haldol  5mg, Ativan 2mg, Benadryl 50mg  Observations: routine observations  Legal: 9.39 Involuntary status  Medical: routine medical observations; no acute concerns  Goal: Sx reduction, medication management, safety planning, milieu therapy.  Dispo: pending

## 2021-11-12 RX ORDER — RISPERIDONE 4 MG/1
3 TABLET ORAL AT BEDTIME
Refills: 0 | Status: DISCONTINUED | OUTPATIENT
Start: 2021-11-12 | End: 2021-11-15

## 2021-11-12 RX ORDER — RISPERIDONE 4 MG/1
2 TABLET ORAL DAILY
Refills: 0 | Status: DISCONTINUED | OUTPATIENT
Start: 2021-11-12 | End: 2021-11-15

## 2021-11-12 RX ORDER — RISPERIDONE 4 MG/1
2 TABLET ORAL ONCE
Refills: 0 | Status: COMPLETED | OUTPATIENT
Start: 2021-11-12 | End: 2021-11-12

## 2021-11-12 RX ORDER — RISPERIDONE 4 MG/1
2 TABLET ORAL
Refills: 0 | Status: DISCONTINUED | OUTPATIENT
Start: 2021-11-12 | End: 2021-11-12

## 2021-11-12 RX ADMIN — RISPERIDONE 3 MILLIGRAM(S): 4 TABLET ORAL at 20:09

## 2021-11-12 RX ADMIN — DIVALPROEX SODIUM 500 MILLIGRAM(S): 500 TABLET, DELAYED RELEASE ORAL at 08:08

## 2021-11-12 RX ADMIN — DIVALPROEX SODIUM 500 MILLIGRAM(S): 500 TABLET, DELAYED RELEASE ORAL at 20:09

## 2021-11-12 RX ADMIN — Medication 5 MILLIGRAM(S): at 20:09

## 2021-11-12 RX ADMIN — RISPERIDONE 2 MILLIGRAM(S): 4 TABLET ORAL at 10:28

## 2021-11-12 RX ADMIN — Medication 50 MILLIGRAM(S): at 20:10

## 2021-11-12 NOTE — BH INPATIENT PSYCHIATRY PROGRESS NOTE - NSBHMETABOLIC_PSY_ALL_CORE_FT
BMI:   HbA1c: A1C with Estimated Average Glucose Result: 5.6 % (11-10-21 @ 09:45)    Glucose:   BP: --  Lipid Panel: Date/Time: 11-10-21 @ 09:45  Cholesterol, Serum: 171  Direct LDL: --  HDL Cholesterol, Serum: 69  Total Cholesterol/HDL Ration Measurement: --  Triglycerides, Serum: 51

## 2021-11-12 NOTE — BH INPATIENT PSYCHIATRY PROGRESS NOTE - OTHER
pt. denies AH, however is engaging in self-talk, appears internally pre-occupied at times grossly loose but improving Preoccupied with energy, chakra, yoga, qigong, and nature  Talking with self  reasoning improving odd, impaired pt. denies AVH, however is engaging in self-talk, appears internally pre-occupied at times

## 2021-11-12 NOTE — BH INPATIENT PSYCHIATRY PROGRESS NOTE - NSBHFUPINTERVALHXFT_PSY_A_CORE
Patient is followed up for psychosis/aggression at home. Chart, medications, and labs reviewed with team, with no acute findings. Patient was seen by NP and medical student (writer). No PO PRNs needed for aggression in the last 24hrs. Patient was seen in his room; he was calm, approachable, pleasant, but oddly related. He remains bizarre, paranoid, delusional, and grandiose. Mood is "good." Patient was observed to be meditating when writer walked into the room. Staff report patient talking to self in his room and throughout the unit. When writer asked, patient denied talking to self and stated that he is "chanting mantras." However, patient appears to be internally preoccupied but denies AVH and no longer hears voices from the "divine mother." Patient is likely minimizing symptoms. Patient reports sleeping well last night and did not wake up at midnight to do yoga, as he did the past 2 nights, because he was "tired." Patient also reports feeling anxious about staying in the unit over the weekend, but did not identify any stressors. No longer reports that his standing medications are making him feel sick, nauseous, or vomiting. However still states that doing yoga, qigong, and breathing exercises makes him feel better than taking the medications. Denies delusions. Denies SI/HI/i/p. Thought is disorganized. Less fixated on discharge today.     Patient is currently adherent to medications. Patient is currently taking Risperdal 2mg BID and Depakote 500mg BID. Discontinued Abilify. Patient was made aware that medication adherence is necessary for discharge planning.

## 2021-11-12 NOTE — BH INPATIENT PSYCHIATRY PROGRESS NOTE - CURRENT MEDICATION
MEDICATIONS  (STANDING):  diVALproex  milliGRAM(s) Oral two times a day  melatonin. 5 milliGRAM(s) Oral at bedtime  risperiDONE  Disintegrating Tablet 2 milliGRAM(s) Oral two times a day  risperiDONE  Disintegrating Tablet 2 milliGRAM(s) Oral once    MEDICATIONS  (PRN):  haloperidol     Tablet 5 milliGRAM(s) Oral every 6 hours PRN agitation  haloperidol    Injectable 5 milliGRAM(s) IntraMuscular Once PRN severe agitation  LORazepam     Tablet 2 milliGRAM(s) Oral every 6 hours PRN Agitation  LORazepam   Injectable 2 milliGRAM(s) IntraMuscular Once PRN severe agitation  traZODone 50 milliGRAM(s) Oral at bedtime PRN insomnia   MEDICATIONS  (STANDING):  diVALproex  milliGRAM(s) Oral two times a day  melatonin. 5 milliGRAM(s) Oral at bedtime  risperiDONE  Disintegrating Tablet 2 milliGRAM(s) Oral two times a day    MEDICATIONS  (PRN):  haloperidol     Tablet 5 milliGRAM(s) Oral every 6 hours PRN agitation  haloperidol    Injectable 5 milliGRAM(s) IntraMuscular Once PRN severe agitation  LORazepam     Tablet 2 milliGRAM(s) Oral every 6 hours PRN Agitation  LORazepam   Injectable 2 milliGRAM(s) IntraMuscular Once PRN severe agitation  traZODone 50 milliGRAM(s) Oral at bedtime PRN insomnia

## 2021-11-12 NOTE — BH INPATIENT PSYCHIATRY PROGRESS NOTE - NSBHMSETHTPROC_PSY_A_CORE
Linear/Circumstantial/Tangential/Illogical/Other Linear/Circumstantial/Tangential/Perseverative/Illogical/Other

## 2021-11-12 NOTE — BH INPATIENT PSYCHIATRY PROGRESS NOTE - NSBHASSESSSUMMFT_PSY_ALL_CORE
The patient is 27 yo single, unemployed, domiciled with parents, non-caregiver, with a history of schizoaffective disorder, with multiple inpatient admissions, most recently "couple of years ago" , non-compliant with his treatment and meds, No history of suicidal attempts or physical aggression noted, no legal involvement, no history pf arrests, history of substance abuse, namely alcohol, K2,  and cannabis; denies recent use. Brought into the ER by EMS this evening after having an argument with his father "because he did not give me money for my birthday". Patient called 911 himself.    The patient is delusional, engaging in self-talk, odd and bizarre; however, has improved overall. During family conference call earlier this week, the patient agreed to take PO medications and restart Risperdal. The patient is in behavioral control on the unit, however, is aggressive once decompensated. He is psychotically ambivalent, is guarded and minimizing symptoms. Denies SI/HI/i/p. Denies auditory and visual hallucinations. Patient is currently adherent to medications. Will titrate Risperdal. Patient is anxious about staying the weekend on the unit and is still preoccupied with discharge.     Plan:  Psychiatry: D/C Abilify. Continue Risperdal 2mg BID, Depakote DR 500mg BID, melatonin 5mg. 10/10: VPA 90.80  PRN medications: Haldol  5mg, Ativan 2mg, Benadryl 50mg  Observations: routine observations  Legal: 9.39 Involuntary status  Medical: routine medical observations; no acute concerns  Goal: Sx reduction, medication management, safety planning, milieu therapy.  Dispo: pending

## 2021-11-12 NOTE — BH INPATIENT PSYCHIATRY PROGRESS NOTE - NSBHCHARTREVIEWVS_PSY_A_CORE FT
Vital Signs Last 24 Hrs  T(C): 36.5 (11-12-21 @ 06:18), Max: 36.9 (11-11-21 @ 19:42)  T(F): 97.7 (11-12-21 @ 06:18), Max: 98.4 (11-11-21 @ 19:42)  HR: --  BP: --  BP(mean): --  RR: --  SpO2: --    Orthostatic VS  11-12-21 @ 08:29  Lying BP: --/-- HR: --  Sitting BP: 137/77 HR: 95  Standing BP: 119/77 HR: 91  Site: --  Mode: --  Orthostatic VS  11-11-21 @ 19:42  Lying BP: --/-- HR: --  Sitting BP: 139/79 HR: 94  Standing BP: 129/99 HR: 95  Site: --  Mode: --  Orthostatic VS  11-11-21 @ 09:05  Lying BP: --/-- HR: --  Sitting BP: 136/73 HR: 81  Standing BP: 133/92 HR: 97  Site: --  Mode: --

## 2021-11-13 RX ADMIN — RISPERIDONE 3 MILLIGRAM(S): 4 TABLET ORAL at 20:28

## 2021-11-13 RX ADMIN — Medication 5 MILLIGRAM(S): at 20:29

## 2021-11-13 RX ADMIN — RISPERIDONE 2 MILLIGRAM(S): 4 TABLET ORAL at 09:32

## 2021-11-13 RX ADMIN — DIVALPROEX SODIUM 500 MILLIGRAM(S): 500 TABLET, DELAYED RELEASE ORAL at 20:28

## 2021-11-13 RX ADMIN — DIVALPROEX SODIUM 500 MILLIGRAM(S): 500 TABLET, DELAYED RELEASE ORAL at 09:32

## 2021-11-13 RX ADMIN — Medication 50 MILLIGRAM(S): at 20:29

## 2021-11-14 RX ADMIN — Medication 50 MILLIGRAM(S): at 20:15

## 2021-11-14 RX ADMIN — RISPERIDONE 3 MILLIGRAM(S): 4 TABLET ORAL at 20:12

## 2021-11-14 RX ADMIN — DIVALPROEX SODIUM 500 MILLIGRAM(S): 500 TABLET, DELAYED RELEASE ORAL at 20:13

## 2021-11-14 RX ADMIN — Medication 5 MILLIGRAM(S): at 20:12

## 2021-11-14 RX ADMIN — RISPERIDONE 2 MILLIGRAM(S): 4 TABLET ORAL at 08:39

## 2021-11-15 PROCEDURE — 99232 SBSQ HOSP IP/OBS MODERATE 35: CPT

## 2021-11-15 RX ORDER — RISPERIDONE 4 MG/1
3 TABLET ORAL
Refills: 0 | Status: DISCONTINUED | OUTPATIENT
Start: 2021-11-15 | End: 2021-11-17

## 2021-11-15 RX ADMIN — Medication 5 MILLIGRAM(S): at 21:15

## 2021-11-15 RX ADMIN — DIVALPROEX SODIUM 500 MILLIGRAM(S): 500 TABLET, DELAYED RELEASE ORAL at 09:08

## 2021-11-15 RX ADMIN — DIVALPROEX SODIUM 500 MILLIGRAM(S): 500 TABLET, DELAYED RELEASE ORAL at 21:16

## 2021-11-15 RX ADMIN — RISPERIDONE 2 MILLIGRAM(S): 4 TABLET ORAL at 09:08

## 2021-11-15 RX ADMIN — Medication 50 MILLIGRAM(S): at 21:16

## 2021-11-15 RX ADMIN — RISPERIDONE 3 MILLIGRAM(S): 4 TABLET ORAL at 21:16

## 2021-11-15 NOTE — BH INPATIENT PSYCHIATRY PROGRESS NOTE - OTHER
guarded pt. denies AVH, however is engaging in self-talk, appears internally pre-occupied at times odd, impaired grossly loose but improving Preoccupied with energy, chakra, yoga, qigong, and nature  Talking with self  reasoning improving

## 2021-11-15 NOTE — BH INPATIENT PSYCHIATRY PROGRESS NOTE - NSBHASSESSSUMMFT_PSY_ALL_CORE
The patient is 25 yo single, unemployed, domiciled with parents, non-caregiver, with a history of schizoaffective disorder, with multiple inpatient admissions, most recently "couple of years ago" , non-compliant with his treatment and meds, No history of suicidal attempts or physical aggression noted, no legal involvement, no history pf arrests, history of substance abuse, namely alcohol, K2,  and cannabis; denies recent use. Brought into the ER by EMS this evening after having an argument with his father "because he did not give me money for my birthday". Patient called 911 himself.    The patient is not seen talking to himself however, remains internally pre-occupied and is guarded. He remains pre-occupied with yoga and spiritual practices, loosely associating practices with his thought process and presentation. The patient is in behavioral control on the unit. He is psychotically ambivalent, is guarded and likely minimizing symptoms. Denies SI/HI/i/p. Denies auditory and visual hallucinations. Patient is currently adherent to medications. Will titrate Risperdal.     Plan:  Psychiatry: Risperdal 3mg BID, Depakote DR 500mg BID, melatonin 5mg.   PRN medications: Haldol  5mg, Ativan 2mg, Benadryl 50mg  Observations: routine observations  Legal: 9.39 Involuntary status  Medical: routine medical observations; no acute concerns  Goal: Sx reduction, medication management, safety planning, milieu therapy.  Dispo: pending

## 2021-11-15 NOTE — BH INPATIENT PSYCHIATRY PROGRESS NOTE - NSBHFUPINTERVALHXFT_PSY_A_CORE
Patient is followed up for psychosis/aggression at home. Chart, medications, and labs reviewed with team, with no acute findings. No events reported overnight. The pt. was seen by the writer in his room. Staff reports he is waking up in the middle of the night to do yoga. The pt. reports he does this purposefully as he was taught to do this by a yogi. The pt. reports he is able to fall asleep afterwards and feels calmer. The pt. was visibly frustrated and upset with the writer, stating that he believed he was going to go home today. The pt. reports he is unable to continue to tolerate the hospitalization. He stated that he is not mentally unstable. The pt. was advised that if discharged this week he is expected to continue outpatient treatment. The pt. stated that he believes he can commit to outpatient treatment. He denied AVH, SI/HI/i/p. When asked about delusions, the pt. stated "I just want the world to be safe." He did not elaborate on what he means by this. The pt. is medication adherent. No SE noted.

## 2021-11-15 NOTE — BH INPATIENT PSYCHIATRY PROGRESS NOTE - CURRENT MEDICATION
MEDICATIONS  (STANDING):  diVALproex  milliGRAM(s) Oral two times a day  melatonin. 5 milliGRAM(s) Oral at bedtime  risperiDONE  Disintegrating Tablet 3 milliGRAM(s) Oral two times a day    MEDICATIONS  (PRN):  haloperidol     Tablet 5 milliGRAM(s) Oral every 6 hours PRN agitation  haloperidol    Injectable 5 milliGRAM(s) IntraMuscular Once PRN severe agitation  LORazepam     Tablet 2 milliGRAM(s) Oral every 6 hours PRN Agitation  LORazepam   Injectable 2 milliGRAM(s) IntraMuscular Once PRN severe agitation  traZODone 50 milliGRAM(s) Oral at bedtime PRN insomnia

## 2021-11-15 NOTE — BH INPATIENT PSYCHIATRY PROGRESS NOTE - NSBHCHARTREVIEWVS_PSY_A_CORE FT
Vital Signs Last 24 Hrs  T(C): 35.9 (11-15-21 @ 14:33), Max: 37.1 (11-14-21 @ 19:42)  T(F): 96.7 (11-15-21 @ 14:33), Max: 98.8 (11-14-21 @ 19:42)  HR: --  BP: --  BP(mean): --  RR: --  SpO2: --    Orthostatic VS  11-15-21 @ 08:37  Lying BP: --/-- HR: --  Sitting BP: 115/73 HR: 98  Standing BP: 120/68 HR: 97  Site: --  Mode: --  Orthostatic VS  11-14-21 @ 19:42  Lying BP: --/-- HR: --  Sitting BP: 127/77 HR: 94  Standing BP: 139/88 HR: 97  Site: --  Mode: --  Orthostatic VS  11-14-21 @ 08:35  Lying BP: --/-- HR: --  Sitting BP: 125/76 HR: 85  Standing BP: 121/72 HR: 96  Site: --  Mode: electronic  Orthostatic VS  11-13-21 @ 23:36  Lying BP: --/-- HR: --  Sitting BP: 148/71 HR: 90  Standing BP: 128/75 HR: 86  Site: --  Mode: --

## 2021-11-16 PROCEDURE — 90853 GROUP PSYCHOTHERAPY: CPT

## 2021-11-16 PROCEDURE — 99232 SBSQ HOSP IP/OBS MODERATE 35: CPT

## 2021-11-16 RX ORDER — LANOLIN ALCOHOL/MO/W.PET/CERES
1 CREAM (GRAM) TOPICAL
Qty: 14 | Refills: 0
Start: 2021-11-16 | End: 2021-11-29

## 2021-11-16 RX ORDER — RISPERIDONE 4 MG/1
1 TABLET ORAL
Qty: 28 | Refills: 0
Start: 2021-11-16 | End: 2021-11-29

## 2021-11-16 RX ORDER — DIVALPROEX SODIUM 500 MG/1
1 TABLET, DELAYED RELEASE ORAL
Qty: 28 | Refills: 0
Start: 2021-11-16 | End: 2021-11-29

## 2021-11-16 RX ORDER — TRAZODONE HCL 50 MG
1 TABLET ORAL
Qty: 14 | Refills: 0
Start: 2021-11-16 | End: 2021-11-29

## 2021-11-16 RX ADMIN — RISPERIDONE 3 MILLIGRAM(S): 4 TABLET ORAL at 20:22

## 2021-11-16 RX ADMIN — Medication 50 MILLIGRAM(S): at 20:22

## 2021-11-16 RX ADMIN — Medication 5 MILLIGRAM(S): at 20:22

## 2021-11-16 RX ADMIN — DIVALPROEX SODIUM 500 MILLIGRAM(S): 500 TABLET, DELAYED RELEASE ORAL at 20:22

## 2021-11-16 RX ADMIN — RISPERIDONE 3 MILLIGRAM(S): 4 TABLET ORAL at 08:35

## 2021-11-16 RX ADMIN — DIVALPROEX SODIUM 500 MILLIGRAM(S): 500 TABLET, DELAYED RELEASE ORAL at 08:35

## 2021-11-16 NOTE — BH PSYCHOLOGY - GROUP THERAPY NOTE - NSPSYCHOLGRPBILLING_PSY_A_CORE
[id number] - Group Psychotherapy
[id number] - Group Psychotherapy
[id number] - Group Psychotherapy
[id number] - Group Psychotherapy

## 2021-11-16 NOTE — BH PSYCHOLOGY - GROUP THERAPY NOTE - NSPSYCHOLGRPCOGPROB_PSY_A_CORE FT
Anxiety, Depression, Emotion dysregulation, Lack of coping skills, Psycho-social stressors, Self care, Problem solving  

## 2021-11-16 NOTE — BH PSYCHOLOGY - GROUP THERAPY NOTE - NSPSYCHOLGRPCOGINT_PSY_A_CORE FT
Cognitive/behavioral therapy, Emotion regulation/coping skills taught, Psychoeducation, Acceptance and Commitment Therapy (ACT)    
Cognitive/behavioral therapy, Emotion regulation/coping skills taught, Psychoeducation, Acceptance and Commitment Therapy (ACT)    
Cognitive/behavioral therapy, Emotion regulation/coping skills taught, Psychoed  
Cognitive/behavioral therapy, Emotion regulation/coping skills taught, Psychoed

## 2021-11-16 NOTE — BH INPATIENT PSYCHIATRY PROGRESS NOTE - NSBHASSESSSUMMFT_PSY_ALL_CORE
The patient is 25 yo single, unemployed, domiciled with parents, non-caregiver, with a history of schizoaffective disorder, with multiple inpatient admissions, most recently "couple of years ago" , non-compliant with his treatment and meds, No history of suicidal attempts or physical aggression noted, no legal involvement, no history pf arrests, history of substance abuse, namely alcohol, K2,  and cannabis; denies recent use. Brought into the ER by EMS this evening after having an argument with his father "because he did not give me money for my birthday". Patient called 911 himself.    The patient appears internally pre-occupied and odd. He denies internal stimuli, reporting that he is instead meditating and reciting mantras. He remains pre-occupied with yoga and spiritual practices, loosely associating practices with his thought process and presentation. The patient is in behavioral control on the unit. He is psychotically ambivalent, is guarded and likely minimizing symptoms. Denies SI/HI/i/p. Denies auditory and visual hallucinations. Patient is currently adherent to medications. Pt. was planned for D/C tomorrow as he is no longer an acute risk of harm to himself or others. [facility] agreed to accept him, however, later stated only if he agrees to take a ONEILL. Discharge may be postponed till Thursday.    Plan:  Psychiatry: Risperdal 3mg BID, Depakote DR 500mg BID, melatonin 5mg.   PRN medications: Haldol  5mg, Ativan 2mg, Benadryl 50mg  Observations: routine observations  Legal: 9.39 Involuntary status  Medical: routine medical observations; no acute concerns  Goal: Sx reduction, medication management, safety planning, milieu therapy.  Dispo: pending

## 2021-11-16 NOTE — BH INPATIENT PSYCHIATRY PROGRESS NOTE - NSBHCHARTREVIEWVS_PSY_A_CORE FT
Vital Signs Last 24 Hrs  T(C): 36.7 (11-16-21 @ 06:47), Max: 36.7 (11-15-21 @ 22:38)  T(F): 98 (11-16-21 @ 06:47), Max: 98 (11-15-21 @ 22:38)  HR: --  BP: --  BP(mean): --  RR: --  SpO2: --    Orthostatic VS  11-16-21 @ 08:17  Lying BP: --/-- HR: --  Sitting BP: 116/71 HR: 74  Standing BP: 121/70 HR: 75  Site: --  Mode: --  Orthostatic VS  11-15-21 @ 18:25  Lying BP: --/-- HR: --  Sitting BP: 131/65 HR: 94  Standing BP: 117/63 HR: 106  Site: --  Mode: --  Orthostatic VS  11-15-21 @ 08:37  Lying BP: --/-- HR: --  Sitting BP: 115/73 HR: 98  Standing BP: 120/68 HR: 97  Site: --  Mode: --  Orthostatic VS  11-14-21 @ 19:42  Lying BP: --/-- HR: --  Sitting BP: 127/77 HR: 94  Standing BP: 139/88 HR: 97  Site: --  Mode: --

## 2021-11-16 NOTE — BH INPATIENT PSYCHIATRY PROGRESS NOTE - NSBHFUPINTERVALHXFT_PSY_A_CORE
Patient is followed up for psychosis/aggression at home. Chart, medications, and labs reviewed with team, with no acute findings. No events reported overnight. The pt. was seen by the writer in his room. He was sitting up in bed and was quite still. The pt. stated he was meditating as the writer approached. The pt. stated he was able to sleep through the night after taking the advice of one of the night nurses. He stated he feels more rested. The writer inquired whether he is experiencing internal stimuli as he laughed inappropriately during the interview. He stated that he does have thoughts which he describes as mantras, but denies he is responding to stimuli. He remains circumstantial when responding to questions, attaching spiritual explanations to all questions regarding his symptoms. The pt. stated that he is willing to take medications because he now views them as "cells" which can help his body in positive ways. He denied VH but stated he is able to see the writer's aura, which he states has happened once before. He denied SI/HI/i/p and delusions. The pt. is medication adherent. No SE noted.

## 2021-11-16 NOTE — BH PSYCHOLOGY - GROUP THERAPY NOTE - NSBHPSYCHOLGRPTYPE_PSY_A_CORE
Cognitive Behavioral Coping Skills

## 2021-11-16 NOTE — BH INPATIENT PSYCHIATRY PROGRESS NOTE - OTHER
guarded reports "thoughts", appears internally pre-occupied at times odd, impaired grossly loose but improving Preoccupied with energy, chakra, yoga, qigong, and nature

## 2021-11-16 NOTE — BH PSYCHOLOGY - GROUP THERAPY NOTE - NSPSYCHOLGRPCOGPT_PSY_A_CORE FT
Patient attended Cognitive Behavioral Therapy Group utilizing concepts and skills from Acceptance and Commitment Therapy (ACT). The group started with a brief check in and mindfulness /relaxation exercise. The group then focused on the topic of distress tolerance and self care. Patients discussed acceptance of emotional experience and the concept of distress tolerance. Patients shared examples of healthy ways to cope with distress and ways to engage in healthy self-care (balanced sleep, healthy eating etc). The  explained concepts, reinforced participation, and engaged patients in the discussion.  
Patient attended Cognitive Behavioral Therapy Group utilizing concepts and skills from Acceptance and Commitment Therapy (ACT). The group started with a brief check in where pt shared their gratitude towards something that they were grateful for today. After, the group read and discussed a willingness metaphor. Pts shared their interpretation of the metaphor which started a meaningful discussion. Finally, the group addressed 'four ways of solving problems' and discussed the one that was most salient. The  explained concepts, reinforced participation, and engaged patients in the discussion.
Pt attended Cognitive Behavioral Therapy group. ACT skills and concepts were also utilized. The group started with a brief check in during which pts were asked to share a quality that they value in their friends. There was a common theme of trust, loyalty and honesty which started a meaningful group discussion and the group members supported one another. Next, the group practiced a mindfulness exercise and talked about how two opposite emotions can exist at the same time. The discussion focused on acknowledging things that may be hard to accept and discussed ways to cope with difficult realities. Group leaders explained concepts, reinforced participation, and engaged patients in the discussion.
Pt attended Cognitive Behavioral Therapy group. ACT skills and concepts were also utilized. The group started with a brief word association game in which the  read out words and the members had to come up with the first word that came to mind. Later, writer introduced the concept of how people associate certain sights, smells, words etc. with important memories. Pts shared one memory each that they associate with a certain smell. This activity increased group cohesion and started a meaningful discussion. The group then read a metaphor and discussed their interpretation. Group ended with finding individual short term goals that pts could work on during the day.  Group leaders shared healthy perspectives on emotions, discussed the benefits of the activity and pts shared their experience.

## 2021-11-16 NOTE — BH PSYCHOLOGY - GROUP THERAPY NOTE - NSBHPSYCHOLASSESSPROV_PSY_A_CORE
Licensed Staff Psychologist and Trainee
Licensed Staff Psychologist

## 2021-11-16 NOTE — BH PSYCHOLOGY - GROUP THERAPY NOTE - NSPSYCHOLGRPCOGGOAL_PSY_A_CORE FT
Decrease symptoms, Develop coping/emotion regulation skills, Psychoed  

## 2021-11-16 NOTE — BH PSYCHOLOGY - GROUP THERAPY NOTE - NSBHPSYCHOLGRPNAME_PSY_A_CORE
CBT (Cognitive Behavioral Therapy) Group

## 2021-11-16 NOTE — BH PSYCHOLOGY - GROUP THERAPY NOTE - NSBHPSYCHOLPARTICIPCOMMENT_PSY_A_CORE FT
Pt was appropriately groomed and fully engaged. Although, he was tangential at times, he was attentive and participated in discussions. Most of his responses were hyperfocused on spirituality and yoga. 
Pt was fully engaged, his comments were tangential at time. Pt was very attentive to discussion
Pt was fully engaged in group and participated in all group activities and discussions. Pt was forthcoming with information but his thought process was tangential at times. Pt talked about his short term mental health goals and shared his childhood memories with the group. Oriented X3, speech was wnl, appropriate with others.
Pt was fully engaged in group and participated in group discussions, however it was noted that pt was less forthcoming and more quiet than usual. During check in, he shared valuing empathy and wished his friends had more empathy. He read from the opposite balance handout and selected items that were relevant to him. He recognized that people may need help at different points in life and talked about accepting the help without resistance. He also talked about 'energy' and 'spirituality' which were off topic and was easily redirectible. Oriented X3, speech was wnl, appropriate with others.

## 2021-11-17 VITALS — HEART RATE: 983 BPM

## 2021-11-17 LAB — VALPROATE SERPL-MCNC: 81.8 UG/ML — SIGNIFICANT CHANGE UP (ref 50–100)

## 2021-11-17 PROCEDURE — 99239 HOSP IP/OBS DSCHRG MGMT >30: CPT

## 2021-11-17 RX ADMIN — DIVALPROEX SODIUM 500 MILLIGRAM(S): 500 TABLET, DELAYED RELEASE ORAL at 08:28

## 2021-11-17 RX ADMIN — RISPERIDONE 3 MILLIGRAM(S): 4 TABLET ORAL at 08:28

## 2021-11-17 NOTE — BH INPATIENT PSYCHIATRY PROGRESS NOTE - NSTXIMPULSPROGRES_PSY_ALL_CORE
No Change
Improving
No Change
Met - goal discontinued
Improving
Improving
No Change

## 2021-11-17 NOTE — BH INPATIENT PSYCHIATRY PROGRESS NOTE - NSICDXBHPRIMARYDX_PSY_ALL_CORE
Schizoaffective disorder, bipolar type   F25.0  

## 2021-11-17 NOTE — BH INPATIENT PSYCHIATRY PROGRESS NOTE - NSBHFUPINTERVALCCFT_PSY_A_CORE
"I don't want to be here anymore"
"I can see your aura"
"Kind of like a monk"
Psychosis
Psychosis
"I feel tired"
"I want to go home so I can practice yoga"
"I just wish to go home"
"I want to go home"

## 2021-11-17 NOTE — BH DISCHARGE NOTE NURSING/SOCIAL WORK/PSYCH REHAB - NSCDUDCCRISIS_PSY_A_CORE
[facility]  1 [phone]) [facility]-WELL ([phone])  Text "WELL" to 81124  Website: www.[employer]/.[employer] [facility] 1 ([phone] 301-VINI (1615) Website: www.safehorizon.org/.National Suicide Prevention Lifeline 5 (364) 049-7143/.  [employer]  1 (974) LIFENET (618-6071)/.  [facility]  [address] [place] [address] [phone]   Hours:  Monday through Friday from 9 AM to 3 PM/.  [place] [employer]  [employer] - [facility] Crisis Line  8 [phone], Option 1

## 2021-11-17 NOTE — BH INPATIENT PSYCHIATRY DISCHARGE NOTE - CASE SUMMARY
Upon admission, the patient was guarded, suspicious and minimizing symptoms. He was also odd, bizarre, thoughts were loosely associated at times, gaze was intense, constricted/blunted affect. The pt. was pre-occupied with spirituality and holistic health including veganism, practicing yoga and qigong. The pt. was elaborate, somewhat fantastic in his explanations of his beliefs. He stated that he prefers to engage in these practices rather than take medications. He endorsed stopping medications 3 months ago at [facility]. He reported AH of the “divine mother” and elicited delusional content. The pt. also endorsed daily marijuana use but was open to substance treatment. His parent stated that his self-care has been poor of recent, he is easily agitated and verbally aggressive. They also report he has been unable to complete yogi courses which he expressed interest in, as he has been unable to tolerate social settings. On the unit, he was seen practicing yoga in the community room, he would also wake up in the middle of the night to exercise for 2 hours. He was agreeable to medications but refused ONEILL. The pt. was restarted on Risperdal and titrated to 3mg BID and Depakote DR 500mg BID. He remained somewhat guarded and symptoms were slow to improve, however, but day of discharge he was more visible and engaged. Less internally pre-occupied, thought process was notable less tangential. The pt. was also able to sleep through the night. He was not violent or aggressive and did not demonstrate mood lability. Hygiene and physical appearance improved. The pt. was no longer an acute risk of harm to self or others and suitable for discharge.    The patient was made aware of the risks and benefits of each medication and tolerated them well with no apparent side effects.     There were no behavioral problems on the unit.  Patient did not become agitated, did not require emergency intramuscular medications or seclusion/restraints, and did not self-harm on the unit. Patient remained actively engaged in treatment and participated in individual, group, and milieu therapy.  Patient got along appropriately with staff and peers.  A family meeting was held prior to discharge for safety planning and disposition planning. Family is supportive and available.      Medically, during this hospitalization the pt. remained stable.     Suicidal and aggression risk assessments were performed on the day of discharge. The patient is at elevated chronic risk of self-harm due to an underlying psychotic disorder. He is not actively suicidal or manic, making him appropriate for less restrictive treatment as an outpatient without need for continued inpatient hospitalization. Protective factors for suicide include future orientation, social support, lack of access, residential stability, good physical health. Risk factors include insomnia, impulsivity, non-compliance, disengagement with treatment, substance use, poor insight.    Immediate risk was minimized by inpatient admission to a safe environment with appropriate supervision and limited access to lethal means. Future risk was minimized before discharge by treatment of anxiety/depressive symptoms, maximizing outpatient support, providing relevant patient education, discussing emergency procedures, and ensuring close follow-up. Patient denies all suicidal and aggressive/homicidal ideation, intent and plan, and, in view of demonstrated clinical improvement, is not judged to be an acute danger to self or others at this time. Although the patient remains at their chronic baseline risk of self-harm, such risk cannot be further ameliorated by continued inpatient treatment and the patient is therefore appropriate for discharge.     On the day of discharge, the patient’s mood and affect are normal/euthymic. Patient denies depressed mood and anxiety. Patient is not hopeless. Sleep and appetite are also normal (at baseline).  Pt’s motor performance and productivity of speech are WNL. Pt denies symptoms of psychosis including AH/VH with no apparent delusions (or is at baseline/not preoccupied with delusions).  Patient denies S/H/I/I/P.     Patient has made clinically meaningful progress during this hospitalization and has clearly benefited from medications and psychotherapy. On day of discharge, the patient has improved significantly and no longer requires inpatient treatment and care. Pt will be discharged and follow-up with outpatient care.      Patient was provided with extensive psychoeducation on treatment options and motivational counseling targeting healthy lifestyle. Patient was instructed on actions for crisis situations, understood and agreed to follow instructions for handling crisis, including coming to ER or calling 911 should the patient or their family feel that they are in danger of hurting self or others. Patient also was given Suicide Prevention Lifeline number 1-920.328.9960 and provided with instructions on its use.   Patient was advised to avoid driving until their reactions are not impaired by medications. Motivational counseling was provided. Family is supportive and was provided with similar safety plan instructions.     Patient will be discharged with the following DSM5 Diagnoses:    PRINCIPAL DISCHARGE DIAGNOSIS  Diagnosis: Schizophrenia    Patient will be discharged on the following medications:   divalproex sodium 500 mg oral delayed release tablet: 1 tab(s) orally 2 times a day  melatonin 5 mg oral tablet: 1 tab(s) orally once a day (at bedtime)   risperiDONE 3 mg oral tablet, disintegratin tab(s) orally 2 times a day  traZODone 50 mg oral tablet: 1 tab(s) orally once a day (at bedtime), As needed, insomnia    Handoff emailed to [facility] [phone] and Crisis [facility] [phone] including discussion of hospital course, treatment, and medications. The patient’s appointments are on  at 11:00AM and  at [facility] at 9:00AM.   Inpatient Provider:	Johnnie Jacobs, ABBE-BC	[phone]

## 2021-11-17 NOTE — BH PSYCHOLOGY - CLINICIAN PSYCHOTHERAPY NOTE - NSBHPSYCHOLGOALS_PSY_A_CORE
Decrease symptoms/Improve family functioning/Improve level of independent functioning/Improve social/vocational/coping skills/Psychoeducation/Treatment compliance
Decrease symptoms/Improve family functioning/Improve level of independent functioning/Improve social/vocational/coping skills/Psychoeducation/Treatment compliance

## 2021-11-17 NOTE — BH DISCHARGE NOTE NURSING/SOCIAL WORK/PSYCH REHAB - PATIENT PORTAL LINK FT
You can access the FollowMyHealth Patient Portal offered by [facility] by registering at the following website: [email or web address]. By joining [facility]’s FollowMyHealth portal, you will also be able to view your health information using other applications (apps) compatible with our system.

## 2021-11-17 NOTE — BH INPATIENT PSYCHIATRY PROGRESS NOTE - NSTXPROBSLPPAT_PSY_ALL_CORE
SLEEP PATTERN, DISTURBED

## 2021-11-17 NOTE — BH PSYCHOLOGY - CLINICIAN PSYCHOTHERAPY NOTE - NSBHPSYCHOLPROBS_PSY_ALL_CORE
Academic/Vocational/Social Dysfunction/Anxiety/Family Dysfunction/Impulsivity
Academic/Vocational/Social Dysfunction/Anxiety/Family Dysfunction/Impulsivity

## 2021-11-17 NOTE — BH SAFETY PLAN - STEP 5 CRISIS
Pt calling to schedule 4th laser hair treatment  Lm that only at [facility] and with Dr Patria Cash cb and schedule apt 
.

## 2021-11-17 NOTE — BH INPATIENT PSYCHIATRY DISCHARGE NOTE - OTHER PAST PSYCHIATRIC HISTORY (INCLUDE DETAILS REGARDING ONSET, COURSE OF ILLNESS, INPATIENT/OUTPATIENT TREATMENT)
Schizoaffective d/o; several psych admissions, Pt is poor historian, states that the last hospitalization was several years ago, he report that he was at [facility] in  2015. Last treatment was in [facility]

## 2021-11-17 NOTE — BH INPATIENT PSYCHIATRY DISCHARGE NOTE - MODIFICATIONS
On admiission, acutely paranoid, poor insight and judgment, bizarre, illogical, intermittent loose association, hyper-Roman Catholic, confirming self-discontinuation of medication. Accepted, tolerated and responded well to risperidone (oral though refused ONEILL) and depakote, gradually improving, sufficient improvement in acute risk for appropriate discharge though remaining high chronic risk.

## 2021-11-17 NOTE — BH PSYCHOLOGY - CLINICIAN PSYCHOTHERAPY NOTE - NSBHPSYCHOLINT_PSY_A_CORE
Cognitive/behavioral therapy/Dynamic issues addressed/Supportive therapy/Treatment compliance encouraged/other...
Cognitive/behavioral therapy/Dynamic issues addressed/Supportive therapy/Treatment compliance encouraged/other...

## 2021-11-17 NOTE — BH INPATIENT PSYCHIATRY PROGRESS NOTE - NSTXDCOPLKPROGRES_PSY_ALL_CORE
No Change
Improving
Improving
No Change
Improving
Improving

## 2021-11-17 NOTE — BH INPATIENT PSYCHIATRY PROGRESS NOTE - NSTXSLPPATGOAL_PSY_ALL_CORE
Be able to sleep for a minimum of six hours daily
Be able to sleep for a minimum of six hours daily
Utilize relaxation techniques to aid in sleeping
Be able to sleep for a minimum of six hours daily
Be able to sleep for a minimum of six hours daily
Utilize relaxation techniques to aid in sleeping
Be able to sleep for a minimum of six hours daily

## 2021-11-17 NOTE — BH INPATIENT PSYCHIATRY DISCHARGE NOTE - HPI (INCLUDE ILLNESS QUALITY, SEVERITY, DURATION, TIMING, CONTEXT, MODIFYING FACTORS, ASSOCIATED SIGNS AND SYMPTOMS)
The patient is 25 yo single, unemployed, domiciled with parents, non-caregiver, with a history of schizoaffective disorder, with multiple inpatient admissions, most recently "couple of years ago" , non-compliant with his treatment and meds, No history of suicidal attempts or physical aggression noted, no legal involvement, no history pf arrests, history of substance abuse, namely alcohol, K2,  and cannabis; denies recent use. Brought into the ER by EMS this evening after having an argument with his father "because he did not give me money for my birthday". Patient called 911 himself.   On assessment , patient is calm and cooperative. He reports that he was upset because it was his birthday and his father did not give him money. They had an argument "because what kind of father does that? They don't say thank you when I do things for them" He states that he is OK otherwise. Patient denies current depressive symptoms including depressed mood, anhedonia, changes in energy/concentration/appetite, sleep disturbances, or feelings of guilt. Patient denies current psychotic symptoms at first, but becomes thought disorganized; with thought blocking, laughs inappropriately sometimes. He is bizarre, he admitted to paranoid delusions on arrival, he stated that he is being watched by the government; then he starts talking about "strange feelings," says "you know this trees are hiding something, I don't know, there is something wrong with them" He denies hearing voices, no visions or delusions, no ideas of reference, thought insertion/broadcasting. Patient denies current manic symptoms including elevated mood, increased irritability, mood lability, distractibility, grandiosity, pressured speech, and increase in goal-directed activity at night, or decreased need for sleep. Pt denies current suicidal ideations, intent or plan. Denies any homicidal ideations.   Patient states that he does not take meds because he does not need to be on med, because he is using marijuana on daily bases and does yoga. He states that he is learning a new spiritual / ancient language, in order to become spiritual. When he is asked to say something in this language he starts mumbling something incoherent. He does not want to start taking any meds "again or see a psychiatrist; "that chapter of my life is closed, I don't need to take any medications" Patient starts laughing , when he asked why he is laughing, he says that he "felt something funny"  No agitation.     Collateral:  Collateral information obtained by JONO from patient's mother as follows as documented on [facility]: As per the mother patient has been laughing , talking to himself and stopped taking care of himself. " See JONO-  note.    On the unit the patient is in behavioral control, is calm and cooperative since being admitted. He is on elopement precautions due to attempting to elope from the ED multiple times yesterday. He was also in 4 point restraints for transfer from the ED. The pt. was seen with the writer, JONO and medical student in his room. He was agreeable to speaking. The pt. was asked to describe events leading up the hospitalization and he stated "You know what, I was meditating and doing yoga, and now I feel better." Upon further exploration, the pt. stated that yesterday was his birthday and he asked his father for birthday money. He reports he father yelled at him and they engaged in a verbal altercation. The pt. stated he called the police believing they could help to de-escalate the situation. He denied pulling a knife on his sister. During the interview, the patient was noted to be pre-occupied with spiritual and holistic methods, attempting to present himself as a spiritual being.   The patient reports that he has been off of medications for the last 3 months, last seen at [facility]'s 3 months ago. The pt. reports that he requested to be taken off of Risperdal and was discharged from their clinic. He recently reports that his sleep has been poor and was able to admit that this could be attributed to being off of medications. The pt. also reported Hx of AVH during prior hospitalizations. He now states that "God speaks to me through my heart." He denies that he hears commands, instead states "He helps me get through hard times." The pt. stated he last heard a "burp" yesterday from God while he was hungry. The pt. said he understood the burp to mean "Look to you right" and he saw a carton of soy milk on his window. The pt. denies VH, delusions, SI/HI/i/p. When asked if he has experienced episodes of depression or nehemiah, he was avoidant and circumstantial in his responses. He alluded to being "sad" at 15 y.o., unable to give a time frame or symptoms. He reports taking Adderall from a neighbor at that time and felt "I could never be sad again" and was to focus better while doing yoga. In terms of nehemiah, the pt. reports that when he practices "Chi-Dong" he gets a surge of energy, however, states this energy varies in duration. He denied impulsivity, racing thoughts, increased goal directed behavior, agitation/irritability. Pt. denies Hx of SA, SI and NSSIB.  The pt. reports he smokes weed daily, "a couple blunts". He denies ETOH, K2, cocaine, heroin and other street drug use. The pt. reports marijuana makes him calm after he does yoga and takes a shower. He reports finishing a couple college courses and did graduate from [employer]. Denies Hx of special ed or seeing a mental health provider as a child. He lives with his parents and has 2 siblings over 30 (boy and girl), who do not live in the home. The pt. does not work at this time, he stated "I don't like working." He reports working at [employer] last year and does not want to go back. He reports his parents support him financially.   The pt. no longer wants to be on Risperdal stating that it made feel "slow" with poor concentration. He agreed to trial Abilify as it seems to have worked for him in the past. He is also agreeable to Depakote.

## 2021-11-17 NOTE — BH INPATIENT PSYCHIATRY PROGRESS NOTE - NSICDXBHSECONDARYDX_PSY_ALL_CORE
Cannabis abuse   F12.10  

## 2021-11-17 NOTE — BH INPATIENT PSYCHIATRY PROGRESS NOTE - NSBHMETABOLICLABS_PSY_ALL_CORE
All labs not within last 12 months, not ordered

## 2021-11-17 NOTE — BH DISCHARGE NOTE NURSING/SOCIAL WORK/PSYCH REHAB - NSDCPRGOAL_PSY_ALL_CORE
Pt made some progress towards his discharge. Pt has demonstrated daily compliance with medication regimen and dosage. Pt has verbalized benefits of medication compliance such as sleep better and feel better. Pt has not identified other specific benefits of medication compliance at this time. Pt has superficially agreed to comply with medication post-discharge. Pt has verbalized his coping skills such as breathings, yoga, talk about his feelings to manage symptoms. Pt was observed to be internally preoccupied, responding to internal stimuli, laughing inappropriately at times. Pt has minimized all symptoms and continue to have limit insight into his illness. Writer provided psychoeducation, pt was not receptive. Pt currently denies SI, HI, AH, and VH. During this hospitalization, pt showed approximately 90% of group attendance. During the group session, pt was able to tolerate group structure, participated relevantly with some redirection. Pt was visible in the day room and was observed to practice Yoga in the day room. Pt showed fair interaction with others. Pt maintained fair ADLs. Pt has participated in his safety plan and he refused press ganey survey.

## 2021-11-17 NOTE — BH PSYCHOLOGY - CLINICIAN PSYCHOTHERAPY NOTE - NSBHPSYCHOLNARRATIVE_PSY_A_CORE FT
Writer and patient wore masks during the session due to COVID precautions. Pt was alert and presented with adequate hygiene and grooming. Pt denied any suicidal ideation, intent or plan and did not endorse any HI. Pt was observed to be comfortable in the milieu. Pt speech was wnl but responses were slightly delayed. Pt shared that he wants to practice "think before he speaks" which is making his speech delayed. Pt denied any A/V hallucination but shared that he can feel and see people's energies. Oriented X3. Parameters of treatment and limitations of confidentiality were discussed. Pt demonstrated fair insight but poor judgement.  During this session, pt reported that his mood was better than last week and he had better sleep. He reported feeling a sense of calm and increased focus. Pt shared that during his admission he lost all interest and motivation to practice yoga but now he has gotten his passion back which makes him feel more like himself. Writer and pt discussed how pt can use his spirituality and practice to benefit him. Pt shared a book he is reading and talked about some aspects that encouraged one mindfulness and present focused thinking. Writer validated pt and also talked about some challenges that he might anticipate post discharge. Pt was receptive to intervention. 
Writer and patient wore masks during the session due to COVID precautions. Pt was alert and presented with adequate hygiene and grooming. Pt denied any suicidal ideation, intent or plan and did not endorse any HI. Pt was observed to be comfortable in the milieu. Pt speech was wnl. His thoughts were slightly distorted. Pt denied any A/V hallucination. Oriented X3. Parameters of treatment and limitations of confidentiality were discussed. Pt demonstrated fair insight but poor judgement.  During this session, pt talked at length about his Yoga practice and how he has mastered the art of yoga. However, reported not practicing it everyday. Pt and writer talked about the importance of yoga and how the best results come from practicing it everyday. Pt agreed and said he was going to practice some form of grounding exercise everyday at least for 15 mins. Writer asked questions around pt's interpersonal functioning and pt shared about his family dynamics. Pt is the youngest of three, domiciled with parents. While he gets along with his father, there are some differing opinions that lead to conflict. Writer used ACT interventions in acknowledging the differences and at the same time learning to communicate individual values. Pt was receptive to intervention and wanted to work on more honest and effective communication with his loved ones.

## 2021-11-17 NOTE — BH DISCHARGE NOTE NURSING/SOCIAL WORK/PSYCH REHAB - NSBHDCADDR1FT_A_CORE
[address] [place] [address], [facility], [place] [address], [facility]

## 2021-11-17 NOTE — BH INPATIENT PSYCHIATRY PROGRESS NOTE - NSBHFUPINTERVALHXFT_PSY_A_CORE
Patient is followed up for psychosis/aggression at home. Chart, medications, and labs reviewed with team, with no acute findings. No events reported overnight. The pt. was observed in the day room, interacting with other patients. He slept through the night per staff. The pt. denied AVH, delusions, SI/HI/i/p. He did not appear internally pre-occupied but was visibly anticipating discharge. The pt. obtained a haircut and shave today. He stated that he looks "kind of like a monk." His discharge was delayed as [facility] required that he be on a ONEILL given his Hx of non-adherence to medication. The pt. refused, but was accepted by [facility] BOOST program. The pt. is no longer an acute risk of harm to himself or others and is appropriate for discharge. The patient and his family were advised to visit the crisis clinic, call 911 or visit the nearest ED for worsening symptoms. VS revealed orthostatic systolic pressure; pt. asymptomatic.

## 2021-11-17 NOTE — BH INPATIENT PSYCHIATRY PROGRESS NOTE - NSDCCRITERIA_PSY_ALL_CORE
Sx reduction/remission, decreased risk of harm

## 2021-11-17 NOTE — BH INPATIENT PSYCHIATRY PROGRESS NOTE - NSBHMSEKNOWHOW_PSY_ALL_CORE
Educational attainment

## 2021-11-17 NOTE — BH INPATIENT PSYCHIATRY PROGRESS NOTE - NSBHPSYCHOLCOGORIENT_PSY_A_CORE
possible AH, pt denies, but he is internally preoccupied/Oriented to time, place, person, situation

## 2021-11-17 NOTE — BH DISCHARGE NOTE NURSING/SOCIAL WORK/PSYCH REHAB - DISCHARGE INSTRUCTIONS AFTERCARE APPOINTMENTS
In order to check the location, date, or time of your aftercare appointment, please refer to your Discharge Instructions Document given to you upon leaving the hospital.  If you have lost the instructions please call [phone]

## 2021-11-17 NOTE — BH INPATIENT PSYCHIATRY PROGRESS NOTE - PRN MEDS
MEDICATIONS  (PRN):  haloperidol     Tablet 5 milliGRAM(s) Oral every 6 hours PRN agitation  haloperidol    Injectable 5 milliGRAM(s) IntraMuscular Once PRN severe agitation  LORazepam     Tablet 2 milliGRAM(s) Oral every 6 hours PRN Agitation  LORazepam   Injectable 2 milliGRAM(s) IntraMuscular Once PRN severe agitation  traZODone 50 milliGRAM(s) Oral at bedtime PRN insomnia  
MEDICATIONS  (PRN):  haloperidol     Tablet 5 milliGRAM(s) Oral every 6 hours PRN agitation  haloperidol    Injectable 5 milliGRAM(s) IntraMuscular Once PRN severe agitation  LORazepam     Tablet 2 milliGRAM(s) Oral every 6 hours PRN Agitation  LORazepam   Injectable 2 milliGRAM(s) IntraMuscular Once PRN severe agitation  traZODone 50 milliGRAM(s) Oral at bedtime PRN insomnia  
MEDICATIONS  (PRN):  haloperidol     Tablet 5 milliGRAM(s) Oral every 6 hours PRN agitation  haloperidol    Injectable 5 milliGRAM(s) IntraMuscular Once PRN severe agitation  LORazepam     Tablet 2 milliGRAM(s) Oral every 6 hours PRN Agitation  LORazepam   Injectable 2 milliGRAM(s) IntraMuscular Once PRN severe agitation  
MEDICATIONS  (PRN):  haloperidol     Tablet 5 milliGRAM(s) Oral every 6 hours PRN agitation  haloperidol    Injectable 5 milliGRAM(s) IntraMuscular Once PRN severe agitation  LORazepam     Tablet 2 milliGRAM(s) Oral every 6 hours PRN Agitation  LORazepam   Injectable 2 milliGRAM(s) IntraMuscular Once PRN severe agitation  traZODone 50 milliGRAM(s) Oral at bedtime PRN insomnia  
MEDICATIONS  (PRN):  haloperidol     Tablet 5 milliGRAM(s) Oral every 6 hours PRN agitation  haloperidol    Injectable 5 milliGRAM(s) IntraMuscular Once PRN severe agitation  LORazepam     Tablet 2 milliGRAM(s) Oral every 6 hours PRN Agitation  LORazepam   Injectable 2 milliGRAM(s) IntraMuscular Once PRN severe agitation  
MEDICATIONS  (PRN):  haloperidol     Tablet 5 milliGRAM(s) Oral every 6 hours PRN agitation  haloperidol    Injectable 5 milliGRAM(s) IntraMuscular Once PRN severe agitation  LORazepam     Tablet 2 milliGRAM(s) Oral every 6 hours PRN Agitation  LORazepam   Injectable 2 milliGRAM(s) IntraMuscular Once PRN severe agitation  traZODone 50 milliGRAM(s) Oral at bedtime PRN insomnia

## 2021-11-17 NOTE — BH INPATIENT PSYCHIATRY PROGRESS NOTE - NSBHASSESSSUMMFT_PSY_ALL_CORE
The patient is 25 yo single, unemployed, domiciled with parents, non-caregiver, with a history of schizoaffective disorder, with multiple inpatient admissions, most recently "couple of years ago" , non-compliant with his treatment and meds, No history of suicidal attempts or physical aggression noted, no legal involvement, no history pf arrests, history of substance abuse, namely alcohol, K2,  and cannabis; denies recent use. Brought into the ER by EMS this evening after having an argument with his father "because he did not give me money for my birthday". Patient called 911 himself.    The patient appears less pre-occupied this morning, was more engaged on the unit, interacting with patients. He denies internal stimuli, reporting that he is instead meditating and reciting mantras. He remains pre-occupied with yoga and spiritual practices. The patient is in behavioral control on the unit. Suspicions that he may he is minimizing symptoms remain. Denies SI/HI/i/p. Denies auditory and visual hallucinations. Patient is currently adherent to medications. Pt. no accepted by [facility]; he will be attending the BOOST program.     Plan:  Psychiatry: Risperdal 3mg BID, Depakote DR 500mg BID, melatonin 5mg.   Dispo: follow-up at [facility]

## 2021-11-17 NOTE — BH INPATIENT PSYCHIATRY PROGRESS NOTE - NSBHMSEBODY_PSY_A_CORE
Average build

## 2021-11-17 NOTE — BH INPATIENT PSYCHIATRY PROGRESS NOTE - NSTXIMPULSDATETRGT_PSY_ALL_CORE
17-Nov-2021
17-Nov-2021
10-Nov-2021
17-Nov-2021
10-Nov-2021
17-Nov-2021
10-Nov-2021

## 2021-11-17 NOTE — BH INPATIENT PSYCHIATRY PROGRESS NOTE - NSTXMEDICDATETRGT_PSY_ALL_CORE
11-Nov-2021
18-Nov-2021
11-Nov-2021
17-Nov-2021
18-Nov-2021
11-Nov-2021
11-Nov-2021
17-Nov-2021
18-Nov-2021
11-Nov-2021
11-Nov-2021

## 2021-11-17 NOTE — BH INPATIENT PSYCHIATRY PROGRESS NOTE - NSTXIMPULSGOAL_PSY_ALL_CORE
Will be able to demonstrate the ability to pause before acting out negatively

## 2021-11-17 NOTE — BH INPATIENT PSYCHIATRY DISCHARGE NOTE - NSDCMRMEDTOKEN_GEN_ALL_CORE_FT
divalproex sodium 500 mg oral delayed release tablet: 1 tab(s) orally 2 times a day  melatonin 5 mg oral tablet: 1 tab(s) orally once a day (at bedtime)   risperiDONE 3 mg oral tablet, disintegratin tab(s) orally 2 times a day  traZODone 50 mg oral tablet: 1 tab(s) orally once a day (at bedtime), As needed, insomnia

## 2021-11-17 NOTE — BH INPATIENT PSYCHIATRY PROGRESS NOTE - NSCGISEVERILLNESS_PSY_ALL_CORE
4 = Moderately ill – overt symptoms causing noticeable, but modest, functional impairment or distress; symptom level may warrant medication

## 2021-11-17 NOTE — BH INPATIENT PSYCHIATRY PROGRESS NOTE - NSTXMEDICGOAL_PSY_ALL_CORE
Take all medications as prescribed
Be able to describe the benefit of medication/treatment
Be able to describe the benefit of medication/treatment
Take all medications as prescribed
Take all medications as prescribed

## 2021-11-17 NOTE — BH INPATIENT PSYCHIATRY DISCHARGE NOTE - HOSPITAL COURSE
to be completed Upon admission, the patient was guarded, suspicious and minimizing symptoms. He was also odd, bizarre, thoughts were loosely associated at times, gaze was intense, constricted/blunted affect. The pt. was pre-occupied with spirituality and holistic health including veganism, practicing yoga and qigong. The pt. was elaborate, somewhat fantastic in his explanations of his beliefs. He stated that he prefers to engage in these practices rather than take medications. He endorsed stopping medications 3 months ago at [facility]. He reported AH of the “divine mother” and elicited delusional content. The pt. also endorsed daily marijuana use but was open to substance treatment. His parent stated that his self-care has been poor of recent, he is easily agitated and verbally aggressive. They also report he has been unable to complete yogi courses which he expressed interest in, as he has been unable to tolerate social settings. On the unit, he was seen practicing yoga in the community room, he would also wake up in the middle of the night to exercise for 2 hours. He was agreeable to medications but refused ONEILL. The pt. was restarted on Risperdal and titrated to 3mg BID and Depakote DR 500mg BID. He remained somewhat guarded and symptoms were slow to improve, however, but day of discharge he was more visible and engaged. Less internally pre-occupied, thought process was notable less tangential. The pt. was also able to sleep through the night. He was not violent or aggressive and did not demonstrate mood lability. Hygiene and physical appearance improved. The pt. was no longer an acute risk of harm to self or others and suitable for discharge.    The patient was made aware of the risks and benefits of each medication and tolerated them well with no apparent side effects.     There were no behavioral problems on the unit.  Patient did not become agitated, did not require emergency intramuscular medications or seclusion/restraints, and did not self-harm on the unit. Patient remained actively engaged in treatment and participated in individual, group, and milieu therapy.  Patient got along appropriately with staff and peers.  A family meeting was held prior to discharge for safety planning and disposition planning. Family is supportive and available.      Medically, during this hospitalization the pt. remained stable.     Suicidal and aggression risk assessments were performed on the day of discharge. The patient is at elevated chronic risk of self-harm due to an underlying psychotic disorder. He is not actively suicidal or manic, making him appropriate for less restrictive treatment as an outpatient without need for continued inpatient hospitalization. Protective factors for suicide include future orientation, social support, lack of access, residential stability, good physical health. Risk factors include insomnia, impulsivity, non-compliance, disengagement with treatment, substance use, poor insight.    Immediate risk was minimized by inpatient admission to a safe environment with appropriate supervision and limited access to lethal means. Future risk was minimized before discharge by treatment of anxiety/depressive symptoms, maximizing outpatient support, providing relevant patient education, discussing emergency procedures, and ensuring close follow-up. Patient denies all suicidal and aggressive/homicidal ideation, intent and plan, and, in view of demonstrated clinical improvement, is not judged to be an acute danger to self or others at this time. Although the patient remains at their chronic baseline risk of self-harm, such risk cannot be further ameliorated by continued inpatient treatment and the patient is therefore appropriate for discharge.     On the day of discharge, the patient’s mood and affect are normal/euthymic. Patient denies depressed mood and anxiety. Patient is not hopeless. Sleep and appetite are also normal (at baseline).  Pt’s motor performance and productivity of speech are WNL. Pt denies symptoms of psychosis including AH/VH with no apparent delusions (or is at baseline/not preoccupied with delusions).  Patient denies S/H/I/I/P.     Patient has made clinically meaningful progress during this hospitalization and has clearly benefited from medications and psychotherapy. On day of discharge, the patient has improved significantly and no longer requires inpatient treatment and care. Pt will be discharged and follow-up with outpatient care.      Patient was provided with extensive psychoeducation on treatment options and motivational counseling targeting healthy lifestyle. Patient was instructed on actions for crisis situations, understood and agreed to follow instructions for handling crisis, including coming to ER or calling 911 should the patient or their family feel that they are in danger of hurting self or others. Patient also was given Suicide Prevention Lifeline number 1-833.949.1659 and provided with instructions on its use.   Patient was advised to avoid driving until their reactions are not impaired by medications. Motivational counseling was provided. Family is supportive and was provided with similar safety plan instructions.     Patient will be discharged with the following DSM5 Diagnoses:    PRINCIPAL DISCHARGE DIAGNOSIS  Diagnosis: Schizophrenia    Patient will be discharged on the following medications:   divalproex sodium 500 mg oral delayed release tablet: 1 tab(s) orally 2 times a day  melatonin 5 mg oral tablet: 1 tab(s) orally once a day (at bedtime)   risperiDONE 3 mg oral tablet, disintegratin tab(s) orally 2 times a day  traZODone 50 mg oral tablet: 1 tab(s) orally once a day (at bedtime), As needed, insomnia    Handoff emailed to [facility] [phone] and Crisis Clinic [phone] including discussion of hospital course, treatment, and medications. The patient’s appointments are on  at 11:00AM and  at [facility] at 9:00AM.   Inpatient Provider:  Johnnie Jacobs, ABBE-BC  [phone]

## 2021-11-17 NOTE — BH INPATIENT PSYCHIATRY PROGRESS NOTE - NSBHCHARTREVIEWVS_PSY_A_CORE FT
Vital Signs Last 24 Hrs  T(C): 36 (11-17-21 @ 06:29), Max: 36.9 (11-16-21 @ 18:42)  T(F): 96.8 (11-17-21 @ 06:29), Max: 98.5 (11-16-21 @ 18:42)  HR: --  BP: --  BP(mean): --  RR: --  SpO2: --    Orthostatic VS  11-17-21 @ 08:46  Lying BP: --/-- HR: --  Sitting BP: 130/60 HR: 94  Standing BP: 115/71 HR: 102  Site: --  Mode: --  Orthostatic VS  11-16-21 @ 18:42  Lying BP: --/-- HR: --  Sitting BP: 117/73 HR: 91  Standing BP: 137/89 HR: 95  Site: --  Mode: --  Orthostatic VS  11-16-21 @ 08:17  Lying BP: --/-- HR: --  Sitting BP: 116/71 HR: 74  Standing BP: 121/70 HR: 75  Site: --  Mode: --  Orthostatic VS  11-15-21 @ 18:25  Lying BP: --/-- HR: --  Sitting BP: 131/65 HR: 94  Standing BP: 117/63 HR: 106  Site: --  Mode: --   Vital Signs Last 24 Hrs  T(C): 36 (11-17-21 @ 06:29), Max: 36.9 (11-16-21 @ 18:42)  T(F): 96.8 (11-17-21 @ 06:29), Max: 98.5 (11-16-21 @ 18:42)  HR: 983 (11-17-21 @ 14:26) (983 - 983)  BP: --  BP(mean): --  RR: --  SpO2: --    Orthostatic VS  11-17-21 @ 08:46  Lying BP: --/-- HR: --  Sitting BP: 130/60 HR: 94  Standing BP: 115/71 HR: 102  Site: --  Mode: --  Orthostatic VS  11-16-21 @ 18:42  Lying BP: --/-- HR: --  Sitting BP: 117/73 HR: 91  Standing BP: 137/89 HR: 95  Site: --  Mode: --  Orthostatic VS  11-16-21 @ 08:17  Lying BP: --/-- HR: --  Sitting BP: 116/71 HR: 74  Standing BP: 121/70 HR: 75  Site: --  Mode: --  Orthostatic VS  11-15-21 @ 18:25  Lying BP: --/-- HR: --  Sitting BP: 131/65 HR: 94  Standing BP: 117/63 HR: 106  Site: --  Mode: --

## 2021-11-17 NOTE — BH DISCHARGE NOTE NURSING/SOCIAL WORK/PSYCH REHAB - NSDCADDINFO1FT_PSY_ALL_CORE
You will need:  - 's License  - Insurance Card    Clinic is located in the [facility] on the [facility]. The street entrance to our building is at [address] [place] [address]. The hospital parking lot can be accessed at [address].

## 2021-11-17 NOTE — BH INPATIENT PSYCHIATRY DISCHARGE NOTE - NSDCRECOMMEND_PSY_ALL_CORE
Unrestricted diet/activity/Recommended laboratory tests/other investigations following discharge.../Recommended medical follow-up following discharge...

## 2021-11-17 NOTE — BH INPATIENT PSYCHIATRY PROGRESS NOTE - NSTXIMPULSDATEEST_PSY_ALL_CORE
03-Nov-2021
17-Nov-2021
10-Nov-2021
03-Nov-2021
10-Nov-2021
03-Nov-2021
10-Nov-2021
10-Nov-2021
03-Nov-2021
03-Nov-2021
10-Nov-2021

## 2021-11-17 NOTE — BH INPATIENT PSYCHIATRY PROGRESS NOTE - OTHER
odd but improved Preoccupied with energy, chakra, yoga, qigong, and nature   grossly loose but improving guarded but more visible on the unit reports "thoughts", appears internally pre-occupied at times reasoning improving

## 2021-11-17 NOTE — BH INPATIENT PSYCHIATRY PROGRESS NOTE - NSTXMEDICPROGRES_PSY_ALL_CORE
No Change
No Change
Improving
No Change
Improving
No Change
Improving
No Change
No Change

## 2021-11-17 NOTE — BH INPATIENT PSYCHIATRY PROGRESS NOTE - NSTXDCOPLKDATEEST_PSY_ALL_CORE
04-Nov-2021

## 2021-11-22 ENCOUNTER — OUTPATIENT (OUTPATIENT)
Dept: OUTPATIENT SERVICES | Facility: HOSPITAL | Age: 26
LOS: 1 days | Discharge: STILL ADMITTED OR ADMINISTRATIVE | End: 2021-11-22

## 2021-11-23 DIAGNOSIS — F25.9 SCHIZOAFFECTIVE DISORDER, UNSPECIFIED: ICD-10-CM

## 2021-12-21 ENCOUNTER — OUTPATIENT (OUTPATIENT)
Dept: OUTPATIENT SERVICES | Facility: HOSPITAL | Age: 26
LOS: 1 days | Discharge: HOME | End: 2021-12-21

## 2022-01-01 NOTE — ED ADULT NURSE NOTE - OBJECTIVE STATEMENT
Pt brought in by EMS for aggressive behavior from home. Pt arrives awake, ambulatory, NAD. Pt is irritable, guarded and paranoid towards others, repeatedly states "I don't feel comfortable answering that". Pt reports he called 911 after his father yelled at him for asking for money for pt's birthday today. Pt making paranoid statements regarding the government and hospital, states "something is not right here, I know something is going on". Pt reports he is currently "discharged from psychiatric medications". Pt reports +AH, states "we all hear thoughts don't we?". Pt refusing vitals in triage. Pt denies current SI/HI. Unk illicit drug/etoh use, pt refusing to answer.
N/A

## 2023-04-24 NOTE — BH SOCIAL WORK INITIAL PSYCHOSOCIAL EVALUATION - NSPROPTRIGHTSUPPORTPERSON_GEN_A_NUR
declines Finasteride Pregnancy And Lactation Text: This medication is absolutely contraindicated during pregnancy. It is unknown if it is excreted in breast milk.

## 2024-06-05 NOTE — PSYCHIATRIC REHAB INITIAL EVALUATION - NSBHPRRECOMMEND_PSY_ALL_CORE
Resident/Fellow Pt is a 25 y/o unemployed  male. Pt has history of multiple psychiatric admissions and reported his last admission was a few years ago. Pt is currently not in treatment and reported he was in treatment with [facility] in the past. Pt was admitted to [place] [address] due to medication non-compliance and psychosis. Pt admitted he has been non-compliance with medication for the past few months.  Pt stated it was his birthday yesterday, his father gave him $20 dollars earlier, he went home after spending the money, asked for more, his father refused to give to him, then he activated 911 call. Pt endorsed poor sleep, decreased appetite, and increased energy. Pt denies other symptoms. As per chart, pt is disorganized, thought blocking, paranoid, believed he was watched by government. Chart indicated, pt’s mother noticed pt has been laughing, talking to himself, and stop take care of himself prior to his admission. Pt admitted he has been smoking 2-4 joints of marijuana daily for the past few years.    Pt admitted he received IM in ED because he tried to elope twice.     Writer met with pt for initial assessment, introduced self and treatment team. Writer has oriented psychiatric rehabilitation department function and services. Writer also provided a copy of welcome letter and the unit schedule. Writer has reviewed unit programming and structure activities with pt. Pt was receptive. Writer also oriented and discussed hospital wide policies and protocols changes in response to covid-19. Writer informed pt that psychiatric rehabilitation staff has been providing groups with requirement of wearing mask and social distancing during the group session. Pt was receptive. Writer also reviewed COVID-19 prevention tips including social distancing, wearing mask and hand hygiene. During this assessment, pt is pleasant, verbal, preoccupied with discharged.